# Patient Record
Sex: FEMALE | Race: BLACK OR AFRICAN AMERICAN | NOT HISPANIC OR LATINO | URBAN - METROPOLITAN AREA
[De-identification: names, ages, dates, MRNs, and addresses within clinical notes are randomized per-mention and may not be internally consistent; named-entity substitution may affect disease eponyms.]

---

## 2017-01-02 ENCOUNTER — EMERGENCY (EMERGENCY)
Facility: HOSPITAL | Age: 42
LOS: 1 days | Discharge: HOME | End: 2017-01-02
Attending: EMERGENCY MEDICINE
Payer: COMMERCIAL

## 2017-01-02 VITALS
OXYGEN SATURATION: 97 % | HEART RATE: 94 BPM | TEMPERATURE: 97 F | RESPIRATION RATE: 18 BRPM | SYSTOLIC BLOOD PRESSURE: 130 MMHG | DIASTOLIC BLOOD PRESSURE: 90 MMHG

## 2017-01-02 VITALS
HEIGHT: 64 IN | SYSTOLIC BLOOD PRESSURE: 122 MMHG | DIASTOLIC BLOOD PRESSURE: 88 MMHG | HEART RATE: 88 BPM | WEIGHT: 225.09 LBS | TEMPERATURE: 98 F | RESPIRATION RATE: 17 BRPM | OXYGEN SATURATION: 98 %

## 2017-01-02 DIAGNOSIS — Z98.89 OTHER SPECIFIED POSTPROCEDURAL STATES: Chronic | ICD-10-CM

## 2017-01-02 DIAGNOSIS — N83.201 UNSPECIFIED OVARIAN CYST, RIGHT SIDE: ICD-10-CM

## 2017-01-02 DIAGNOSIS — N83.202 UNSPECIFIED OVARIAN CYST, LEFT SIDE: ICD-10-CM

## 2017-01-02 DIAGNOSIS — N60.91 UNSPECIFIED BENIGN MAMMARY DYSPLASIA OF RIGHT BREAST: Chronic | ICD-10-CM

## 2017-01-02 LAB
ALBUMIN SERPL ELPH-MCNC: 3.8 G/DL — SIGNIFICANT CHANGE UP (ref 3.5–5)
ALP SERPL-CCNC: 60 U/L — SIGNIFICANT CHANGE UP (ref 40–120)
ALT FLD-CCNC: 22 U/L DA — SIGNIFICANT CHANGE UP (ref 10–60)
ANION GAP SERPL CALC-SCNC: 6 MMOL/L — SIGNIFICANT CHANGE UP (ref 5–17)
APPEARANCE UR: CLEAR — SIGNIFICANT CHANGE UP
AST SERPL-CCNC: 21 U/L — SIGNIFICANT CHANGE UP (ref 10–40)
BASOPHILS # BLD AUTO: 0.2 K/UL — SIGNIFICANT CHANGE UP (ref 0–0.2)
BASOPHILS NFR BLD AUTO: 1.6 % — SIGNIFICANT CHANGE UP (ref 0–2)
BILIRUB SERPL-MCNC: 0.3 MG/DL — SIGNIFICANT CHANGE UP (ref 0.2–1.2)
BILIRUB UR-MCNC: NEGATIVE — SIGNIFICANT CHANGE UP
BUN SERPL-MCNC: 8 MG/DL — SIGNIFICANT CHANGE UP (ref 7–18)
CALCIUM SERPL-MCNC: 9.5 MG/DL — SIGNIFICANT CHANGE UP (ref 8.4–10.5)
CHLORIDE SERPL-SCNC: 103 MMOL/L — SIGNIFICANT CHANGE UP (ref 96–108)
CO2 SERPL-SCNC: 28 MMOL/L — SIGNIFICANT CHANGE UP (ref 22–31)
COLOR SPEC: YELLOW — SIGNIFICANT CHANGE UP
CREAT SERPL-MCNC: 0.76 MG/DL — SIGNIFICANT CHANGE UP (ref 0.5–1.3)
DIFF PNL FLD: NEGATIVE — SIGNIFICANT CHANGE UP
EOSINOPHIL # BLD AUTO: 0 K/UL — SIGNIFICANT CHANGE UP (ref 0–0.5)
EOSINOPHIL NFR BLD AUTO: 0.4 % — SIGNIFICANT CHANGE UP (ref 0–6)
GLUCOSE SERPL-MCNC: 82 MG/DL — SIGNIFICANT CHANGE UP (ref 70–99)
GLUCOSE UR QL: NEGATIVE — SIGNIFICANT CHANGE UP
HCG UR QL: NEGATIVE — SIGNIFICANT CHANGE UP
HCT VFR BLD CALC: 44.5 % — SIGNIFICANT CHANGE UP (ref 34.5–45)
HGB BLD-MCNC: 14.3 G/DL — SIGNIFICANT CHANGE UP (ref 11.5–15.5)
KETONES UR-MCNC: NEGATIVE — SIGNIFICANT CHANGE UP
LEUKOCYTE ESTERASE UR-ACNC: NEGATIVE — SIGNIFICANT CHANGE UP
LYMPHOCYTES # BLD AUTO: 37.6 % — SIGNIFICANT CHANGE UP (ref 13–44)
LYMPHOCYTES # BLD AUTO: 4.3 K/UL — HIGH (ref 1–3.3)
MAGNESIUM SERPL-MCNC: 1.8 MG/DL — SIGNIFICANT CHANGE UP (ref 1.8–2.4)
MCHC RBC-ENTMCNC: 29 PG — SIGNIFICANT CHANGE UP (ref 27–34)
MCHC RBC-ENTMCNC: 32.2 GM/DL — SIGNIFICANT CHANGE UP (ref 32–36)
MCV RBC AUTO: 90 FL — SIGNIFICANT CHANGE UP (ref 80–100)
MONOCYTES # BLD AUTO: 0.9 K/UL — SIGNIFICANT CHANGE UP (ref 0–0.9)
MONOCYTES NFR BLD AUTO: 7.8 % — SIGNIFICANT CHANGE UP (ref 2–14)
NEUTROPHILS # BLD AUTO: 6 K/UL — SIGNIFICANT CHANGE UP (ref 1.8–7.4)
NEUTROPHILS NFR BLD AUTO: 52.6 % — SIGNIFICANT CHANGE UP (ref 43–77)
NITRITE UR-MCNC: NEGATIVE — SIGNIFICANT CHANGE UP
PH UR: 5 — SIGNIFICANT CHANGE UP (ref 4.8–8)
PLATELET # BLD AUTO: 399 K/UL — SIGNIFICANT CHANGE UP (ref 150–400)
POTASSIUM SERPL-MCNC: 5 MMOL/L — SIGNIFICANT CHANGE UP (ref 3.5–5.3)
POTASSIUM SERPL-SCNC: 5 MMOL/L — SIGNIFICANT CHANGE UP (ref 3.5–5.3)
PROT SERPL-MCNC: 8.2 G/DL — SIGNIFICANT CHANGE UP (ref 6–8.3)
PROT UR-MCNC: NEGATIVE — SIGNIFICANT CHANGE UP
RBC # BLD: 4.94 M/UL — SIGNIFICANT CHANGE UP (ref 3.8–5.2)
RBC # FLD: 12.2 % — SIGNIFICANT CHANGE UP (ref 10.3–14.5)
SODIUM SERPL-SCNC: 137 MMOL/L — SIGNIFICANT CHANGE UP (ref 135–145)
SP GR SPEC: 1.02 — SIGNIFICANT CHANGE UP (ref 1.01–1.02)
UROBILINOGEN FLD QL: NEGATIVE — SIGNIFICANT CHANGE UP
WBC # BLD: 11.4 K/UL — HIGH (ref 3.8–10.5)
WBC # FLD AUTO: 11.4 K/UL — HIGH (ref 3.8–10.5)

## 2017-01-02 PROCEDURE — 99284 EMERGENCY DEPT VISIT MOD MDM: CPT | Mod: 25

## 2017-01-02 PROCEDURE — 85027 COMPLETE CBC AUTOMATED: CPT

## 2017-01-02 PROCEDURE — 81003 URINALYSIS AUTO W/O SCOPE: CPT

## 2017-01-02 PROCEDURE — 99285 EMERGENCY DEPT VISIT HI MDM: CPT

## 2017-01-02 PROCEDURE — 81025 URINE PREGNANCY TEST: CPT

## 2017-01-02 PROCEDURE — 83735 ASSAY OF MAGNESIUM: CPT

## 2017-01-02 PROCEDURE — 76856 US EXAM PELVIC COMPLETE: CPT

## 2017-01-02 PROCEDURE — 76830 TRANSVAGINAL US NON-OB: CPT

## 2017-01-02 PROCEDURE — 80053 COMPREHEN METABOLIC PANEL: CPT

## 2017-01-02 PROCEDURE — 76830 TRANSVAGINAL US NON-OB: CPT | Mod: 26

## 2017-01-02 PROCEDURE — 96374 THER/PROPH/DIAG INJ IV PUSH: CPT

## 2017-01-02 PROCEDURE — 76856 US EXAM PELVIC COMPLETE: CPT | Mod: 26

## 2017-01-02 RX ORDER — KETOROLAC TROMETHAMINE 30 MG/ML
30 SYRINGE (ML) INJECTION ONCE
Qty: 0 | Refills: 0 | Status: DISCONTINUED | OUTPATIENT
Start: 2017-01-02 | End: 2017-01-02

## 2017-01-02 RX ORDER — IBUPROFEN 200 MG
1 TABLET ORAL
Qty: 20 | Refills: 0 | OUTPATIENT
Start: 2017-01-02 | End: 2017-01-07

## 2017-01-02 RX ADMIN — Medication 30 MILLIGRAM(S): at 16:00

## 2017-01-02 RX ADMIN — Medication 30 MILLIGRAM(S): at 12:24

## 2017-01-02 NOTE — ED PROVIDER NOTE - CONDUCTED A DETAILED DISCUSSION WITH PATIENT AND/OR GUARDIAN REGARDING, MDM
return to ED if symptoms worsen, persist or questions arise/need for outpatient follow-up return to ED if symptoms worsen, persist or questions arise/need for outpatient follow-up/radiology results/lab results

## 2017-01-02 NOTE — ED ADULT NURSE NOTE - OBJECTIVE STATEMENT
AOX3 +ambulatory patient reports lower abdominal pain radiating to the leg. Patient denies any recent travels, no fevers

## 2017-01-02 NOTE — ED PROVIDER NOTE - OBJECTIVE STATEMENT
42 y/o female with no PMHx presents to the ED c/o abd pain onset today. Pt reports she has an "e-sure" that prevents pregnancy, today, pt notes cramping to lower abd and lower back pain with numbness in leg. Pt denies fever, dysuria, vaginal bleeding, discharge, vomiting, or any other complaints. LMP: 11/24/16, denies pregnancy.

## 2017-02-28 ENCOUNTER — RESULT REVIEW (OUTPATIENT)
Age: 42
End: 2017-02-28

## 2017-03-20 ENCOUNTER — EMERGENCY (EMERGENCY)
Facility: HOSPITAL | Age: 42
LOS: 1 days | Discharge: HOME | End: 2017-03-20
Attending: EMERGENCY MEDICINE
Payer: COMMERCIAL

## 2017-03-20 VITALS
RESPIRATION RATE: 18 BRPM | HEIGHT: 64 IN | OXYGEN SATURATION: 100 % | HEART RATE: 90 BPM | TEMPERATURE: 98 F | WEIGHT: 218.04 LBS | DIASTOLIC BLOOD PRESSURE: 87 MMHG | SYSTOLIC BLOOD PRESSURE: 110 MMHG

## 2017-03-20 DIAGNOSIS — S80.01XA CONTUSION OF RIGHT KNEE, INITIAL ENCOUNTER: ICD-10-CM

## 2017-03-20 DIAGNOSIS — Z98.89 OTHER SPECIFIED POSTPROCEDURAL STATES: Chronic | ICD-10-CM

## 2017-03-20 DIAGNOSIS — M54.5 LOW BACK PAIN: ICD-10-CM

## 2017-03-20 DIAGNOSIS — M25.562 PAIN IN LEFT KNEE: ICD-10-CM
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
## 2017-03-20 DIAGNOSIS — N60.91 UNSPECIFIED BENIGN MAMMARY DYSPLASIA OF RIGHT BREAST: Chronic | ICD-10-CM

## 2017-03-20 PROCEDURE — 99283 EMERGENCY DEPT VISIT LOW MDM: CPT

## 2017-03-20 PROCEDURE — 73562 X-RAY EXAM OF KNEE 3: CPT | Mod: 26,RT

## 2017-03-20 PROCEDURE — 73562 X-RAY EXAM OF KNEE 3: CPT

## 2017-03-20 RX ORDER — METHOCARBAMOL 500 MG/1
750 TABLET, FILM COATED ORAL ONCE
Qty: 0 | Refills: 0 | Status: COMPLETED | OUTPATIENT
Start: 2017-03-20 | End: 2017-03-20

## 2017-03-20 RX ORDER — IBUPROFEN 200 MG
1 TABLET ORAL
Qty: 20 | Refills: 0 | OUTPATIENT
Start: 2017-03-20 | End: 2017-03-25

## 2017-03-20 RX ORDER — METHOCARBAMOL 500 MG/1
1 TABLET, FILM COATED ORAL
Qty: 12 | Refills: 0 | OUTPATIENT
Start: 2017-03-20 | End: 2017-03-24

## 2017-03-20 RX ORDER — IBUPROFEN 200 MG
600 TABLET ORAL ONCE
Qty: 0 | Refills: 0 | Status: COMPLETED | OUTPATIENT
Start: 2017-03-20 | End: 2017-03-20

## 2017-03-20 RX ADMIN — Medication 600 MILLIGRAM(S): at 20:01

## 2017-03-20 RX ADMIN — Medication 600 MILLIGRAM(S): at 20:20
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
## 2017-03-20 NOTE — ED PROVIDER NOTE - PHYSICAL EXAMINATION
Right knee: mild anterior swelling with patellar bony TTP. full range of motion. DP/PT pulses intact 2+.  Left knee: full range of motion, no swelling, no TTP. Ambulatory.
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
## 2017-03-20 NOTE — ED PROVIDER NOTE - PROGRESS NOTE DETAILS
Xray negative for fracture/dislocation. Will discharge with IBU and Robaxin Rx and follow up with PMD. Pt is well appearing walking with steady gait, stable for discharge and follow up without fail with medical doctor. I had a detailed discussion with the patient and/or guardian regarding the historical points, exam findings, and any diagnostic results supporting the discharge diagnosis. Pt educated on care and need for follow up. Strict return instructions and red flag signs and symptoms discussed with patient. Questions answered. Pt shows understanding of discharge information and agrees to follow.

## 2017-03-20 NOTE — ED PROVIDER NOTE - MUSCULOSKELETAL MINIMAL EXAM
normal range of motion/No spinal/paraspinal TTP. Mild localized right lower back point TTP and muscle tenseness

## 2017-03-20 NOTE — ED PROVIDER NOTE - CONDUCTED A DETAILED DISCUSSION WITH PATIENT AND/OR GUARDIAN REGARDING, MDM
need for outpatient follow-up/return to ED if symptoms worsen, persist or questions arise return to ED if symptoms worsen, persist or questions arise/radiology results/need for outpatient follow-up

## 2017-11-16 NOTE — ED ADULT NURSE NOTE - PATIENT DISCHARGE SIGNATURE
Called to discuss results and recommendations with patient per Mahendra Noonan MD, a message was left for the patient to call back.     20-Mar-2017

## 2018-06-12 ENCOUNTER — APPOINTMENT (OUTPATIENT)
Dept: SURGERY | Facility: CLINIC | Age: 43
End: 2018-06-12
Payer: COMMERCIAL

## 2018-06-12 VITALS
TEMPERATURE: 98.5 F | DIASTOLIC BLOOD PRESSURE: 82 MMHG | BODY MASS INDEX: 39.44 KG/M2 | HEART RATE: 83 BPM | OXYGEN SATURATION: 99 % | HEIGHT: 64 IN | SYSTOLIC BLOOD PRESSURE: 121 MMHG | WEIGHT: 231 LBS

## 2018-06-12 DIAGNOSIS — K21.9 GASTRO-ESOPHAGEAL REFLUX DISEASE W/OUT ESOPHAGITIS: ICD-10-CM

## 2018-06-12 PROCEDURE — 99213 OFFICE O/P EST LOW 20 MIN: CPT

## 2018-06-15 ENCOUNTER — EMERGENCY (EMERGENCY)
Facility: HOSPITAL | Age: 43
LOS: 1 days | Discharge: HOME | End: 2018-06-15
Payer: COMMERCIAL

## 2018-06-15 VITALS
WEIGHT: 225.09 LBS | RESPIRATION RATE: 18 BRPM | OXYGEN SATURATION: 100 % | HEART RATE: 83 BPM | HEIGHT: 64 IN | SYSTOLIC BLOOD PRESSURE: 116 MMHG | DIASTOLIC BLOOD PRESSURE: 82 MMHG | TEMPERATURE: 98 F

## 2018-06-15 DIAGNOSIS — N60.91 UNSPECIFIED BENIGN MAMMARY DYSPLASIA OF RIGHT BREAST: Chronic | ICD-10-CM

## 2018-06-15 DIAGNOSIS — Z98.89 OTHER SPECIFIED POSTPROCEDURAL STATES: Chronic | ICD-10-CM

## 2018-06-15 PROCEDURE — 99284 EMERGENCY DEPT VISIT MOD MDM: CPT

## 2018-06-15 PROCEDURE — 73502 X-RAY EXAM HIP UNI 2-3 VIEWS: CPT

## 2018-06-15 PROCEDURE — 73502 X-RAY EXAM HIP UNI 2-3 VIEWS: CPT | Mod: 26,LT

## 2018-06-15 PROCEDURE — 99283 EMERGENCY DEPT VISIT LOW MDM: CPT | Mod: 25

## 2018-06-15 RX ORDER — IBUPROFEN 200 MG
600 TABLET ORAL ONCE
Qty: 0 | Refills: 0 | Status: COMPLETED | OUTPATIENT
Start: 2018-06-15 | End: 2018-06-15

## 2018-06-15 RX ORDER — METHOCARBAMOL 500 MG/1
2 TABLET, FILM COATED ORAL
Qty: 24 | Refills: 0 | OUTPATIENT
Start: 2018-06-15 | End: 2018-06-18

## 2018-06-15 RX ORDER — METHOCARBAMOL 500 MG/1
1500 TABLET, FILM COATED ORAL ONCE
Qty: 0 | Refills: 0 | Status: COMPLETED | OUTPATIENT
Start: 2018-06-15 | End: 2018-06-15

## 2018-06-15 RX ORDER — IBUPROFEN 200 MG
1 TABLET ORAL
Qty: 20 | Refills: 0 | OUTPATIENT
Start: 2018-06-15 | End: 2018-06-19

## 2018-06-15 RX ADMIN — METHOCARBAMOL 1500 MILLIGRAM(S): 500 TABLET, FILM COATED ORAL at 17:26

## 2018-06-15 RX ADMIN — Medication 600 MILLIGRAM(S): at 17:27

## 2018-06-15 RX ADMIN — Medication 600 MILLIGRAM(S): at 18:33

## 2018-06-15 NOTE — ED PROVIDER NOTE - MEDICAL DECISION MAKING DETAILS
43 y/o F pt presents with L hip pain s/p mechanical fall. Likely contusion. Will X-Ray to r/o fracture of the L hip, and will reassess.

## 2018-06-15 NOTE — ED PROVIDER NOTE - PMH
GERD (gastroesophageal reflux disease)    Herniation of intervertebral disc of lumbar region    Ovarian cyst

## 2018-06-15 NOTE — ED PROVIDER NOTE - CONDUCTED A DETAILED DISCUSSION WITH PATIENT AND/OR GUARDIAN REGARDING, MDM
need for outpatient follow-up/radiology results return to ED if symptoms worsen, persist or questions arise/need for outpatient follow-up/radiology results

## 2018-06-20 ENCOUNTER — OUTPATIENT (OUTPATIENT)
Dept: OUTPATIENT SERVICES | Facility: HOSPITAL | Age: 43
LOS: 1 days | End: 2018-06-20
Payer: COMMERCIAL

## 2018-06-20 ENCOUNTER — APPOINTMENT (OUTPATIENT)
Dept: RADIOLOGY | Facility: HOSPITAL | Age: 43
End: 2018-06-20

## 2018-06-20 DIAGNOSIS — N60.91 UNSPECIFIED BENIGN MAMMARY DYSPLASIA OF RIGHT BREAST: Chronic | ICD-10-CM

## 2018-06-20 DIAGNOSIS — Z98.89 OTHER SPECIFIED POSTPROCEDURAL STATES: Chronic | ICD-10-CM

## 2018-06-20 DIAGNOSIS — Z98.84 BARIATRIC SURGERY STATUS: ICD-10-CM

## 2018-06-20 DIAGNOSIS — Z46.51 ENCOUNTER FOR FITTING AND ADJUSTMENT OF GASTRIC LAP BAND: ICD-10-CM

## 2018-06-20 PROCEDURE — 74241: CPT

## 2018-06-20 PROCEDURE — 74241: CPT | Mod: 26

## 2018-06-26 ENCOUNTER — APPOINTMENT (OUTPATIENT)
Dept: SURGERY | Facility: CLINIC | Age: 43
End: 2018-06-26
Payer: COMMERCIAL

## 2018-06-26 VITALS
OXYGEN SATURATION: 99 % | DIASTOLIC BLOOD PRESSURE: 87 MMHG | HEIGHT: 64 IN | TEMPERATURE: 98.6 F | WEIGHT: 231 LBS | BODY MASS INDEX: 39.44 KG/M2 | HEART RATE: 86 BPM | SYSTOLIC BLOOD PRESSURE: 130 MMHG

## 2018-06-26 PROCEDURE — 99213 OFFICE O/P EST LOW 20 MIN: CPT

## 2018-07-20 PROBLEM — N83.209 UNSPECIFIED OVARIAN CYST, UNSPECIFIED SIDE: Chronic | Status: ACTIVE | Noted: 2017-03-20

## 2018-07-20 PROBLEM — M51.26 OTHER INTERVERTEBRAL DISC DISPLACEMENT, LUMBAR REGION: Chronic | Status: ACTIVE | Noted: 2018-06-16

## 2018-07-24 ENCOUNTER — OUTPATIENT (OUTPATIENT)
Dept: OUTPATIENT SERVICES | Facility: HOSPITAL | Age: 43
LOS: 1 days | End: 2018-07-24
Payer: COMMERCIAL

## 2018-07-24 VITALS
DIASTOLIC BLOOD PRESSURE: 88 MMHG | TEMPERATURE: 98 F | HEIGHT: 64 IN | SYSTOLIC BLOOD PRESSURE: 124 MMHG | WEIGHT: 225.09 LBS | HEART RATE: 76 BPM | RESPIRATION RATE: 18 BRPM

## 2018-07-24 DIAGNOSIS — K95.09 OTHER COMPLICATIONS OF GASTRIC BAND PROCEDURE: ICD-10-CM

## 2018-07-24 DIAGNOSIS — Z98.890 OTHER SPECIFIED POSTPROCEDURAL STATES: Chronic | ICD-10-CM

## 2018-07-24 DIAGNOSIS — Z98.89 OTHER SPECIFIED POSTPROCEDURAL STATES: Chronic | ICD-10-CM

## 2018-07-24 DIAGNOSIS — K44.9 DIAPHRAGMATIC HERNIA WITHOUT OBSTRUCTION OR GANGRENE: ICD-10-CM

## 2018-07-24 DIAGNOSIS — Z01.818 ENCOUNTER FOR OTHER PREPROCEDURAL EXAMINATION: ICD-10-CM

## 2018-07-24 DIAGNOSIS — Z98.84 BARIATRIC SURGERY STATUS: Chronic | ICD-10-CM

## 2018-07-24 DIAGNOSIS — N60.91 UNSPECIFIED BENIGN MAMMARY DYSPLASIA OF RIGHT BREAST: Chronic | ICD-10-CM

## 2018-07-24 LAB — HCG UR QL: NEGATIVE — SIGNIFICANT CHANGE UP

## 2018-07-24 PROCEDURE — 81025 URINE PREGNANCY TEST: CPT

## 2018-07-24 PROCEDURE — G0463: CPT

## 2018-07-24 NOTE — H&P PST ADULT - FAMILY HISTORY
Father  Still living? Yes, Estimated age: 61-70  Family history of stroke, Age at diagnosis: Age Unknown     Mother  Still living? Yes, Estimated age: 61-70  Family history of congestive heart failure, Age at diagnosis: Age Unknown

## 2018-07-24 NOTE — H&P PST ADULT - PMH
GERD (gastroesophageal reflux disease)    Herniation of intervertebral disc of lumbar region    Hiatal hernia    Obstructive sleep apnea on CPAP    Ovarian cyst GERD (gastroesophageal reflux disease)    Herniation of intervertebral disc of lumbar region    Hiatal hernia    Obstructive sleep apnea on CPAP    Osteoarthritis    Ovarian cyst    Tendonitis of left hip

## 2018-07-24 NOTE — H&P PST ADULT - PSH
Benign cyst of right breast    History of adjustable gastric banding    History of arthroscopy of both knees    History of     S/P D&C (status post dilation and curettage)

## 2018-07-24 NOTE — H&P PST ADULT - ASSESSMENT
42-year-old female with history of arthritis, left hip tendonitis comes to PST with reports of frequent heartburn and  reflux.

## 2018-07-24 NOTE — H&P PST ADULT - HISTORY OF PRESENT ILLNESS
Patient reports an increase of heartburn and  reflux over the past two years.  Patient has a hiatal hernia  and states food does not always go down.

## 2018-07-25 ENCOUNTER — TRANSCRIPTION ENCOUNTER (OUTPATIENT)
Age: 43
End: 2018-07-25

## 2018-07-26 ENCOUNTER — INPATIENT (INPATIENT)
Facility: HOSPITAL | Age: 43
LOS: 0 days | Discharge: HOME | DRG: 328 | End: 2018-07-27
Attending: SPECIALIST | Admitting: SPECIALIST
Payer: COMMERCIAL

## 2018-07-26 ENCOUNTER — RESULT REVIEW (OUTPATIENT)
Age: 43
End: 2018-07-26

## 2018-07-26 VITALS
OXYGEN SATURATION: 100 % | DIASTOLIC BLOOD PRESSURE: 83 MMHG | HEART RATE: 80 BPM | SYSTOLIC BLOOD PRESSURE: 114 MMHG | HEIGHT: 64 IN | TEMPERATURE: 99 F | RESPIRATION RATE: 18 BRPM | WEIGHT: 225.09 LBS

## 2018-07-26 DIAGNOSIS — Z98.89 OTHER SPECIFIED POSTPROCEDURAL STATES: Chronic | ICD-10-CM

## 2018-07-26 DIAGNOSIS — K95.09 OTHER COMPLICATIONS OF GASTRIC BAND PROCEDURE: ICD-10-CM

## 2018-07-26 DIAGNOSIS — N60.91 UNSPECIFIED BENIGN MAMMARY DYSPLASIA OF RIGHT BREAST: Chronic | ICD-10-CM

## 2018-07-26 DIAGNOSIS — Z98.84 BARIATRIC SURGERY STATUS: Chronic | ICD-10-CM

## 2018-07-26 DIAGNOSIS — Z98.890 OTHER SPECIFIED POSTPROCEDURAL STATES: Chronic | ICD-10-CM

## 2018-07-26 LAB — BLD GP AB SCN SERPL QL: SIGNIFICANT CHANGE UP

## 2018-07-26 PROCEDURE — 88300 SURGICAL PATH GROSS: CPT | Mod: 26

## 2018-07-26 PROCEDURE — 43774 LAP RMVL GASTR ADJ ALL PARTS: CPT | Mod: AS

## 2018-07-26 RX ORDER — ONDANSETRON 8 MG/1
4 TABLET, FILM COATED ORAL ONCE
Qty: 0 | Refills: 0 | Status: DISCONTINUED | OUTPATIENT
Start: 2018-07-26 | End: 2018-07-26

## 2018-07-26 RX ORDER — PANTOPRAZOLE SODIUM 20 MG/1
40 TABLET, DELAYED RELEASE ORAL DAILY
Qty: 0 | Refills: 0 | Status: DISCONTINUED | OUTPATIENT
Start: 2018-07-26 | End: 2018-07-27

## 2018-07-26 RX ORDER — ACETAMINOPHEN 500 MG
1000 TABLET ORAL ONCE
Qty: 0 | Refills: 0 | Status: COMPLETED | OUTPATIENT
Start: 2018-07-26 | End: 2018-07-26

## 2018-07-26 RX ORDER — ACETAMINOPHEN 500 MG
1000 TABLET ORAL ONCE
Qty: 0 | Refills: 0 | Status: DISCONTINUED | OUTPATIENT
Start: 2018-07-26 | End: 2018-07-27

## 2018-07-26 RX ORDER — KETOROLAC TROMETHAMINE 30 MG/ML
30 SYRINGE (ML) INJECTION EVERY 6 HOURS
Qty: 0 | Refills: 0 | Status: DISCONTINUED | OUTPATIENT
Start: 2018-07-26 | End: 2018-07-27

## 2018-07-26 RX ORDER — HEPARIN SODIUM 5000 [USP'U]/ML
5000 INJECTION INTRAVENOUS; SUBCUTANEOUS EVERY 8 HOURS
Qty: 0 | Refills: 0 | Status: DISCONTINUED | OUTPATIENT
Start: 2018-07-26 | End: 2018-07-27

## 2018-07-26 RX ORDER — SODIUM CHLORIDE 9 MG/ML
1000 INJECTION, SOLUTION INTRAVENOUS
Qty: 0 | Refills: 0 | Status: DISCONTINUED | OUTPATIENT
Start: 2018-07-26 | End: 2018-07-27

## 2018-07-26 RX ORDER — MORPHINE SULFATE 50 MG/1
4 CAPSULE, EXTENDED RELEASE ORAL EVERY 6 HOURS
Qty: 0 | Refills: 0 | Status: DISCONTINUED | OUTPATIENT
Start: 2018-07-26 | End: 2018-07-27

## 2018-07-26 RX ORDER — HYDROMORPHONE HYDROCHLORIDE 2 MG/ML
0.5 INJECTION INTRAMUSCULAR; INTRAVENOUS; SUBCUTANEOUS
Qty: 0 | Refills: 0 | Status: DISCONTINUED | OUTPATIENT
Start: 2018-07-26 | End: 2018-07-26

## 2018-07-26 RX ORDER — SODIUM CHLORIDE 9 MG/ML
3 INJECTION INTRAMUSCULAR; INTRAVENOUS; SUBCUTANEOUS EVERY 8 HOURS
Qty: 0 | Refills: 0 | Status: DISCONTINUED | OUTPATIENT
Start: 2018-07-26 | End: 2018-07-26

## 2018-07-26 RX ORDER — ONDANSETRON 8 MG/1
4 TABLET, FILM COATED ORAL EVERY 6 HOURS
Qty: 0 | Refills: 0 | Status: DISCONTINUED | OUTPATIENT
Start: 2018-07-26 | End: 2018-07-27

## 2018-07-26 RX ADMIN — HEPARIN SODIUM 5000 UNIT(S): 5000 INJECTION INTRAVENOUS; SUBCUTANEOUS at 21:24

## 2018-07-26 RX ADMIN — Medication 1000 MILLIGRAM(S): at 17:44

## 2018-07-26 RX ADMIN — HYDROMORPHONE HYDROCHLORIDE 0.5 MILLIGRAM(S): 2 INJECTION INTRAMUSCULAR; INTRAVENOUS; SUBCUTANEOUS at 17:44

## 2018-07-26 RX ADMIN — Medication 400 MILLIGRAM(S): at 15:45

## 2018-07-26 NOTE — ASU PATIENT PROFILE, ADULT - PMH
GERD (gastroesophageal reflux disease)    Herniation of intervertebral disc of lumbar region    Hiatal hernia    Obstructive sleep apnea on CPAP    Osteoarthritis    Ovarian cyst    Tendonitis of left hip

## 2018-07-26 NOTE — CHART NOTE - NSCHARTNOTEFT_GEN_A_CORE
Pt POD 0 s/p laparoscopic DEWAYNE, removal gastric band/port  comfortable  no n/v  voided    Vital Signs Last 24 Hrs  T(C): 36.8 (26 Jul 2018 21:49), Max: 37.1 (26 Jul 2018 18:15)  T(F): 98.2 (26 Jul 2018 21:49), Max: 98.7 (26 Jul 2018 18:15)  HR: 64 (26 Jul 2018 21:49) (64 - 89)  BP: 120/74 (26 Jul 2018 21:49) (108/63 - 128/85)  BP(mean): --  RR: 16 (26 Jul 2018 21:49) (13 - 18)  SpO2: 100% (26 Jul 2018 21:49) (98% - 100%)    abd soft, inc sites CDI    stable post-op

## 2018-07-26 NOTE — BRIEF OPERATIVE NOTE - PROCEDURE
<<-----Click on this checkbox to enter Procedure Serosal tear repair  07/26/2018  Gastric  Active  OVIDIO  Laparoscopic lysis of adhesions  07/26/2018    Active  OVIDIO  Laparoscopic removal of gastric band and port  07/26/2018    Active  MOZGA

## 2018-07-27 ENCOUNTER — TRANSCRIPTION ENCOUNTER (OUTPATIENT)
Age: 43
End: 2018-07-27

## 2018-07-27 VITALS
TEMPERATURE: 98 F | DIASTOLIC BLOOD PRESSURE: 80 MMHG | OXYGEN SATURATION: 100 % | HEART RATE: 71 BPM | RESPIRATION RATE: 18 BRPM | SYSTOLIC BLOOD PRESSURE: 124 MMHG

## 2018-07-27 LAB
ANION GAP SERPL CALC-SCNC: 6 MMOL/L — SIGNIFICANT CHANGE UP (ref 5–17)
BASOPHILS # BLD AUTO: 0.1 K/UL — SIGNIFICANT CHANGE UP (ref 0–0.2)
BASOPHILS NFR BLD AUTO: 0.8 % — SIGNIFICANT CHANGE UP (ref 0–2)
BUN SERPL-MCNC: 10 MG/DL — SIGNIFICANT CHANGE UP (ref 7–18)
CALCIUM SERPL-MCNC: 8.9 MG/DL — SIGNIFICANT CHANGE UP (ref 8.4–10.5)
CHLORIDE SERPL-SCNC: 104 MMOL/L — SIGNIFICANT CHANGE UP (ref 96–108)
CO2 SERPL-SCNC: 29 MMOL/L — SIGNIFICANT CHANGE UP (ref 22–31)
CREAT SERPL-MCNC: 0.78 MG/DL — SIGNIFICANT CHANGE UP (ref 0.5–1.3)
EOSINOPHIL # BLD AUTO: 0 K/UL — SIGNIFICANT CHANGE UP (ref 0–0.5)
EOSINOPHIL NFR BLD AUTO: 0.2 % — SIGNIFICANT CHANGE UP (ref 0–6)
GLUCOSE SERPL-MCNC: 80 MG/DL — SIGNIFICANT CHANGE UP (ref 70–99)
HCT VFR BLD CALC: 40 % — SIGNIFICANT CHANGE UP (ref 34.5–45)
HGB BLD-MCNC: 12.7 G/DL — SIGNIFICANT CHANGE UP (ref 11.5–15.5)
LYMPHOCYTES # BLD AUTO: 2.3 K/UL — SIGNIFICANT CHANGE UP (ref 1–3.3)
LYMPHOCYTES # BLD AUTO: 20.8 % — SIGNIFICANT CHANGE UP (ref 13–44)
MCHC RBC-ENTMCNC: 29.3 PG — SIGNIFICANT CHANGE UP (ref 27–34)
MCHC RBC-ENTMCNC: 31.8 GM/DL — LOW (ref 32–36)
MCV RBC AUTO: 92.2 FL — SIGNIFICANT CHANGE UP (ref 80–100)
MONOCYTES # BLD AUTO: 0.8 K/UL — SIGNIFICANT CHANGE UP (ref 0–0.9)
MONOCYTES NFR BLD AUTO: 7.3 % — SIGNIFICANT CHANGE UP (ref 2–14)
NEUTROPHILS # BLD AUTO: 7.9 K/UL — HIGH (ref 1.8–7.4)
NEUTROPHILS NFR BLD AUTO: 70.9 % — SIGNIFICANT CHANGE UP (ref 43–77)
PLATELET # BLD AUTO: 366 K/UL — SIGNIFICANT CHANGE UP (ref 150–400)
POTASSIUM SERPL-MCNC: 4 MMOL/L — SIGNIFICANT CHANGE UP (ref 3.5–5.3)
POTASSIUM SERPL-SCNC: 4 MMOL/L — SIGNIFICANT CHANGE UP (ref 3.5–5.3)
RBC # BLD: 4.34 M/UL — SIGNIFICANT CHANGE UP (ref 3.8–5.2)
RBC # FLD: 12.4 % — SIGNIFICANT CHANGE UP (ref 10.3–14.5)
SODIUM SERPL-SCNC: 139 MMOL/L — SIGNIFICANT CHANGE UP (ref 135–145)
WBC # BLD: 11.2 K/UL — HIGH (ref 3.8–10.5)
WBC # FLD AUTO: 11.2 K/UL — HIGH (ref 3.8–10.5)

## 2018-07-27 PROCEDURE — 86901 BLOOD TYPING SEROLOGIC RH(D): CPT

## 2018-07-27 PROCEDURE — 80048 BASIC METABOLIC PNL TOTAL CA: CPT

## 2018-07-27 PROCEDURE — 86900 BLOOD TYPING SEROLOGIC ABO: CPT

## 2018-07-27 PROCEDURE — 85027 COMPLETE CBC AUTOMATED: CPT

## 2018-07-27 PROCEDURE — 88300 SURGICAL PATH GROSS: CPT

## 2018-07-27 PROCEDURE — 86850 RBC ANTIBODY SCREEN: CPT

## 2018-07-27 RX ORDER — PANTOPRAZOLE SODIUM 20 MG/1
40 TABLET, DELAYED RELEASE ORAL DAILY
Qty: 0 | Refills: 0 | Status: DISCONTINUED | OUTPATIENT
Start: 2018-07-27 | End: 2018-07-27

## 2018-07-27 RX ORDER — OXYCODONE AND ACETAMINOPHEN 5; 325 MG/1; MG/1
1 TABLET ORAL EVERY 6 HOURS
Qty: 0 | Refills: 0 | Status: DISCONTINUED | OUTPATIENT
Start: 2018-07-27 | End: 2018-07-27

## 2018-07-27 RX ORDER — PANTOPRAZOLE SODIUM 20 MG/1
40 TABLET, DELAYED RELEASE ORAL
Qty: 0 | Refills: 0 | Status: DISCONTINUED | OUTPATIENT
Start: 2018-07-27 | End: 2018-07-27

## 2018-07-27 RX ORDER — PANTOPRAZOLE SODIUM 20 MG/1
1 TABLET, DELAYED RELEASE ORAL
Qty: 30 | Refills: 0
Start: 2018-07-27 | End: 2018-08-25

## 2018-07-27 RX ADMIN — Medication 30 MILLIGRAM(S): at 07:00

## 2018-07-27 RX ADMIN — OXYCODONE AND ACETAMINOPHEN 1 TABLET(S): 5; 325 TABLET ORAL at 16:07

## 2018-07-27 RX ADMIN — HEPARIN SODIUM 5000 UNIT(S): 5000 INJECTION INTRAVENOUS; SUBCUTANEOUS at 06:29

## 2018-07-27 RX ADMIN — HEPARIN SODIUM 5000 UNIT(S): 5000 INJECTION INTRAVENOUS; SUBCUTANEOUS at 15:07

## 2018-07-27 RX ADMIN — OXYCODONE AND ACETAMINOPHEN 1 TABLET(S): 5; 325 TABLET ORAL at 15:07

## 2018-07-27 RX ADMIN — Medication 30 MILLIGRAM(S): at 06:29

## 2018-07-27 NOTE — DISCHARGE NOTE ADULT - MEDICATION SUMMARY - MEDICATIONS TO TAKE
I will START or STAY ON the medications listed below when I get home from the hospital:    oxyCODONE-acetaminophen 5 mg-325 mg oral tablet  -- 1 tab(s) by mouth every 6 hours, As needed, Moderate Pain (4 - 6) MDD:4  -- Indication: For moderate pain     Tums  --  by mouth , As Needed  -- Indication: For as directed     Protonix 40 mg oral delayed release tablet  -- 1 tab(s) by mouth once a day   -- It is very important that you take or use this exactly as directed.  Do not skip doses or discontinue unless directed by your doctor.  Obtain medical advice before taking any non-prescription drugs as some may affect the action of this medication.  Swallow whole.  Do not crush.    -- Indication: For GI ppx

## 2018-07-27 NOTE — DISCHARGE NOTE ADULT - PATIENT PORTAL LINK FT
You can access the [facility] Portal, offered by [facility], by registering with the following website: [email or web address]

## 2018-07-27 NOTE — DISCHARGE NOTE ADULT - PLAN OF CARE
wound healing Continue pureed diet for the next 4 days, afterwards may resume regular diet. Follow up with  in 1 week in office continue all home medication, follow up with your PCP

## 2018-07-27 NOTE — DISCHARGE NOTE ADULT - CARE PLAN
Principal Discharge DX:	History of adjustable gastric banding  Goal:	wound healing  Assessment and plan of treatment:	Continue pureed diet for the next 4 days, afterwards may resume regular diet. Follow up with  in 1 week in office  Secondary Diagnosis:	GERD (gastroesophageal reflux disease)  Assessment and plan of treatment:	continue all home medication, follow up with your PCP  Secondary Diagnosis:	Obstructive sleep apnea on CPAP  Assessment and plan of treatment:	continue all home medication, follow up with your PCP  Secondary Diagnosis:	Osteoarthritis  Assessment and plan of treatment:	continue all home medication, follow up with your PCP  Secondary Diagnosis:	Tendonitis of left hip  Secondary Diagnosis:	Hiatal hernia  Secondary Diagnosis:	Benign cyst of right breast

## 2018-07-27 NOTE — DISCHARGE NOTE ADULT - SECONDARY DIAGNOSIS.
GERD (gastroesophageal reflux disease) Obstructive sleep apnea on CPAP Osteoarthritis Tendonitis of left hip Hiatal hernia Benign cyst of right breast

## 2018-07-27 NOTE — PROGRESS NOTE ADULT - SUBJECTIVE AND OBJECTIVE BOX
INTERVAL HPI/OVERNIGHT EVENTS:    Pt s/p gastric band removal, DEWAYNE, gastric serosal tear repair 7/26,  seen and examined at bedside. Admits to incisional pain, denies nausea or vomiting. No fever, chills, SOB or CP. Pt is NPO.     Vital Signs Last 24 Hrs  T(C): 36.5 (27 Jul 2018 06:53), Max: 37.1 (26 Jul 2018 18:15)  T(F): 97.7 (27 Jul 2018 06:53), Max: 98.7 (26 Jul 2018 18:15)  HR: 68 (27 Jul 2018 06:53) (62 - 89)  BP: 126/76 (27 Jul 2018 06:53) (108/63 - 137/73)  BP(mean): --  RR: 18 (27 Jul 2018 06:53) (13 - 18)  SpO2: 100% (27 Jul 2018 06:53) (98% - 100%)  I&O's Detail    26 Jul 2018 07:01  -  27 Jul 2018 07:00  --------------------------------------------------------  IN:    Lactated Ringers IV Bolus: 1300 mL  Total IN: 1300 mL    OUT:  Total OUT: 0 mL    Total NET: 1300 mL        pantoprazole  Injectable 40 milliGRAM(s) IV Push daily      Physical Exam  General: AAOx3, No acute distress  Skin: No jaundice, no icterus  Abdomen: soft, nondistended, incisional TTP, dressing c/d/i, no rebound tenderness, no guarding, no palpable masses  Extremities: non edematous, no calf pain bilaterally

## 2018-07-27 NOTE — PROGRESS NOTE ADULT - ASSESSMENT
43 y/o Female s/p gastric band removal, DEWAYNE, gastric serosal tear repair 7/26    -Advance diet as tolerated   -Pain medication PRN   -GI ppx   -DVT ppx   -OOB/ Ambulate   -DC planning

## 2018-07-31 LAB — SURGICAL PATHOLOGY FINAL REPORT - CH: SIGNIFICANT CHANGE UP

## 2018-09-12 PROBLEM — K44.9 DIAPHRAGMATIC HERNIA WITHOUT OBSTRUCTION OR GANGRENE: Chronic | Status: ACTIVE | Noted: 2018-07-24

## 2018-09-12 PROBLEM — G47.33 OBSTRUCTIVE SLEEP APNEA (ADULT) (PEDIATRIC): Chronic | Status: ACTIVE | Noted: 2018-07-24

## 2018-09-12 PROBLEM — M19.90 UNSPECIFIED OSTEOARTHRITIS, UNSPECIFIED SITE: Chronic | Status: ACTIVE | Noted: 2018-07-24

## 2018-09-12 PROBLEM — M76.892 OTHER SPECIFIED ENTHESOPATHIES OF LEFT LOWER LIMB, EXCLUDING FOOT: Chronic | Status: ACTIVE | Noted: 2018-07-24

## 2018-09-18 ENCOUNTER — APPOINTMENT (OUTPATIENT)
Dept: SURGERY | Facility: CLINIC | Age: 43
End: 2018-09-18

## 2018-12-20 ENCOUNTER — EMERGENCY (EMERGENCY)
Facility: HOSPITAL | Age: 43
LOS: 1 days | Discharge: HOME | End: 2018-12-20
Attending: EMERGENCY MEDICINE
Payer: SELF-PAY

## 2018-12-20 VITALS
RESPIRATION RATE: 20 BRPM | TEMPERATURE: 98 F | HEART RATE: 83 BPM | OXYGEN SATURATION: 98 % | DIASTOLIC BLOOD PRESSURE: 87 MMHG | SYSTOLIC BLOOD PRESSURE: 129 MMHG

## 2018-12-20 VITALS — HEIGHT: 65 IN | WEIGHT: 149.91 LBS

## 2018-12-20 DIAGNOSIS — Z98.89 OTHER SPECIFIED POSTPROCEDURAL STATES: Chronic | ICD-10-CM

## 2018-12-20 DIAGNOSIS — Z98.84 BARIATRIC SURGERY STATUS: Chronic | ICD-10-CM

## 2018-12-20 DIAGNOSIS — Z98.890 OTHER SPECIFIED POSTPROCEDURAL STATES: Chronic | ICD-10-CM

## 2018-12-20 DIAGNOSIS — N60.91 UNSPECIFIED BENIGN MAMMARY DYSPLASIA OF RIGHT BREAST: Chronic | ICD-10-CM

## 2018-12-20 PROCEDURE — 73030 X-RAY EXAM OF SHOULDER: CPT | Mod: 26,RT

## 2018-12-20 PROCEDURE — 99283 EMERGENCY DEPT VISIT LOW MDM: CPT | Mod: 25

## 2018-12-20 PROCEDURE — 99283 EMERGENCY DEPT VISIT LOW MDM: CPT

## 2018-12-20 PROCEDURE — 73030 X-RAY EXAM OF SHOULDER: CPT

## 2018-12-20 RX ORDER — DICLOFENAC SODIUM 30 MG/G
1 GEL TOPICAL
Qty: 60 | Refills: 0
Start: 2018-12-20 | End: 2019-01-18

## 2018-12-20 RX ORDER — ACETAMINOPHEN 500 MG
650 TABLET ORAL ONCE
Qty: 0 | Refills: 0 | Status: COMPLETED | OUTPATIENT
Start: 2018-12-20 | End: 2018-12-20

## 2018-12-20 RX ADMIN — Medication 650 MILLIGRAM(S): at 17:33

## 2018-12-20 NOTE — ED PROVIDER NOTE - MEDICAL DECISION MAKING DETAILS
44 y/o F pt with R shoulder pain and possible axillary nerve strain and muscle spasm. Will check X-ray, give pain control medication and muscle relaxant, and reassess. Pt to follow up with orthopedic and neurology.
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
## 2019-05-24 ENCOUNTER — EMERGENCY (EMERGENCY)
Facility: HOSPITAL | Age: 44
LOS: 1 days | Discharge: HOME | End: 2019-05-24
Attending: EMERGENCY MEDICINE
Payer: COMMERCIAL

## 2019-05-24 VITALS
SYSTOLIC BLOOD PRESSURE: 132 MMHG | WEIGHT: 244.93 LBS | DIASTOLIC BLOOD PRESSURE: 93 MMHG | HEART RATE: 82 BPM | OXYGEN SATURATION: 98 % | RESPIRATION RATE: 16 BRPM | HEIGHT: 64 IN | TEMPERATURE: 97 F

## 2019-05-24 DIAGNOSIS — N60.91 UNSPECIFIED BENIGN MAMMARY DYSPLASIA OF RIGHT BREAST: Chronic | ICD-10-CM

## 2019-05-24 DIAGNOSIS — Z98.89 OTHER SPECIFIED POSTPROCEDURAL STATES: Chronic | ICD-10-CM

## 2019-05-24 DIAGNOSIS — Z98.890 OTHER SPECIFIED POSTPROCEDURAL STATES: Chronic | ICD-10-CM

## 2019-05-24 DIAGNOSIS — Z98.84 BARIATRIC SURGERY STATUS: Chronic | ICD-10-CM

## 2019-05-24 PROCEDURE — 73110 X-RAY EXAM OF WRIST: CPT

## 2019-05-24 PROCEDURE — 73110 X-RAY EXAM OF WRIST: CPT | Mod: 26,50

## 2019-05-24 PROCEDURE — 99283 EMERGENCY DEPT VISIT LOW MDM: CPT

## 2019-05-24 NOTE — ED ADULT TRIAGE NOTE - CHIEF COMPLAINT QUOTE
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
## 2019-05-25 NOTE — ED PROVIDER NOTE - PHYSICAL EXAMINATION
CONSTITUTIONAL: Well appearing, well nourished, awake, alert and in no apparent distress.  HEENT: Head is atraumatic. Eyes clear bilaterally, normal EOMI. Airway patent.  CARDIAC: Normal rate, regular rhythm.  Heart sounds S1, S2.   RESPIRATORY: Breath sounds clear and equal bilaterally. no tachypnea, respiratory distress.   GASTROINTESTINAL: Abdomen soft, non-tender, no guarding, distension.  MUSCULOSKELETAL: Spine appears normal, no midline spinal tenderness, range of motion is not limited, no muscle or joint tenderness.  no focal bony injury to wrist b/l, NVI, no bony tenderness to hand  NEUROLOGICAL: Alert and oriented, no focal deficits, no motor or sensory deficits.  SKIN: Skin normal color for race, warm, dry and intact. No evidence of rash.  PSYCHIATRIC: Alert and oriented to person, place, time/situation. normal mood and affect. no apparent risk to self or others.

## 2019-05-25 NOTE — ED PROVIDER NOTE - OBJECTIVE STATEMENT
Pt previously healthy with complaints of bilateral [facility], no falls, paresthesias, numbness, no other injury, Pt has not tried anything for symptoms, no other aggravating or relieving factors.

## 2019-05-25 NOTE — ED ADULT NURSE NOTE - NSIMPLEMENTINTERV_GEN_ALL_ED
Implemented All Universal Safety Interventions:  [place] to call system. Call bell, personal items and telephone within reach. Instruct patient to call for assistance. Room bathroom lighting operational. Non-slip footwear when patient is off stretcher. Physically safe environment: no spills, clutter or unnecessary equipment. Stretcher in lowest position, wheels locked, appropriate side rails in place.

## 2019-07-20 ENCOUNTER — EMERGENCY (EMERGENCY)
Facility: HOSPITAL | Age: 44
LOS: 1 days | Discharge: HOME | End: 2019-07-20
Attending: STUDENT IN AN ORGANIZED HEALTH CARE EDUCATION/TRAINING PROGRAM
Payer: COMMERCIAL

## 2019-07-20 VITALS
WEIGHT: 244.93 LBS | SYSTOLIC BLOOD PRESSURE: 111 MMHG | HEART RATE: 73 BPM | HEIGHT: 64 IN | OXYGEN SATURATION: 97 % | DIASTOLIC BLOOD PRESSURE: 77 MMHG | TEMPERATURE: 98 F | RESPIRATION RATE: 20 BRPM

## 2019-07-20 DIAGNOSIS — Z98.89 OTHER SPECIFIED POSTPROCEDURAL STATES: Chronic | ICD-10-CM

## 2019-07-20 DIAGNOSIS — Z98.890 OTHER SPECIFIED POSTPROCEDURAL STATES: Chronic | ICD-10-CM

## 2019-07-20 DIAGNOSIS — N60.91 UNSPECIFIED BENIGN MAMMARY DYSPLASIA OF RIGHT BREAST: Chronic | ICD-10-CM

## 2019-07-20 DIAGNOSIS — Z98.84 BARIATRIC SURGERY STATUS: Chronic | ICD-10-CM

## 2019-07-20 PROCEDURE — 99053 MED SERV 10PM-8AM 24 HR FAC: CPT

## 2019-07-20 PROCEDURE — 99283 EMERGENCY DEPT VISIT LOW MDM: CPT

## 2019-07-20 RX ORDER — IBUPROFEN 200 MG
1 TABLET ORAL
Qty: 20 | Refills: 0
Start: 2019-07-20

## 2019-07-20 RX ORDER — LIDOCAINE 4 G/100G
1 CREAM TOPICAL
Qty: 5 | Refills: 0
Start: 2019-07-20

## 2019-07-20 RX ORDER — CYCLOBENZAPRINE HYDROCHLORIDE 10 MG/1
1 TABLET, FILM COATED ORAL
Qty: 10 | Refills: 0
Start: 2019-07-20

## 2019-07-20 RX ORDER — IBUPROFEN 200 MG
600 TABLET ORAL ONCE
Refills: 0 | Status: COMPLETED | OUTPATIENT
Start: 2019-07-20 | End: 2019-07-20

## 2019-07-20 RX ADMIN — Medication 600 MILLIGRAM(S): at 08:20

## 2019-07-20 NOTE — ED ADULT NURSE NOTE - OBJECTIVE STATEMENT
Patient present to ED with c/o right sided back pain radiating down right leg on pain scale at present time 5/10 sharp in sensation. Took motrin last night

## 2019-07-20 NOTE — ED ADULT NURSE NOTE - CHPI ED NUR SYMPTOMS NEG
no tingling/no difficulty bearing weight/no numbness/no fatigue/no bladder dysfunction/no bowel dysfunction/no constipation/no motor function loss/no anorexia/no neck tenderness

## 2019-07-20 NOTE — ED PROVIDER NOTE - CLINICAL SUMMARY MEDICAL DECISION MAKING FREE TEXT BOX
patient presenting with neck pain. no midline neck pain or neuro deficit. no midline ttp. appears to be msk in nature. will give pain med, return precaution and instruction to f.u pmd

## 2019-07-20 NOTE — ED ADULT NURSE NOTE - NSIMPLEMENTINTERV_GEN_ALL_ED
Implemented All Fall with Harm Risk Interventions:  [place] to call system. Call bell, personal items and telephone within reach. Instruct patient to call for assistance. Room bathroom lighting operational. Non-slip footwear when patient is off stretcher. Physically safe environment: no spills, clutter or unnecessary equipment. Stretcher in lowest position, wheels locked, appropriate side rails in place. Provide visual cue, wrist band, yellow gown, etc. Monitor gait and stability. Monitor for mental status changes and reorient to person, place, and time. Review medications for side effects contributing to fall risk. Reinforce activity limits and safety measures with patient and family. Provide visual clues: red socks.

## 2019-07-20 NOTE — ED PROVIDER NOTE - OBJECTIVE STATEMENT
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 denies midline neck pain, focal weakness, numbness, fall, head injury, cp, sob, fever, chills, cough or neck stiffness. endorses pain worse with palpation and movement but no limited rom.

## 2019-07-20 NOTE — ED ADULT NURSE NOTE - ED STAT RN HANDOFF DETAILS
Patient discharged home as per MD order. All discharge instructions , F/U visits provided to patient. Prescriptions sent to pharmacy. Patient verbalizes understanding leaving ambulatory in no avute distress.
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
## 2020-07-06 NOTE — ASU PREOP CHECKLIST - PATIENT SENT TO
----- Message from Isaac Vora MD sent at 7/5/2020  5:14 PM CDT -----  a1c is much improved at 7.3--sugars average 163 over last 2 months; normal thyroid test; cmp shows normal sodium, potassium and calcium; fbs of 93; normal kidney function for her age; normal liver function; globulin level is high again--was normal 2 years ago and high 3 years ago--get serum protein electrophoresis; lipid panel looks good except for low levels of good cholesterol--go over numbers; cbc shows normal rbc, wbc and platelet count; sridhar shows very slight increase in microalbumin; u/a shows few wbcs and bacteria--culture was negative; no change in meds.  
Patient is notified of lab results , values are given. She will get a SPE done  
Patient returning call regarding below.  Patient is at [phone] any time.  
operating room

## 2020-08-05 NOTE — ED ADULT NURSE NOTE - CCCP TRG CHIEF CMPLNT
----- Message from Shelly Sotelo MD sent at 8/5/2020  1:36 PM CDT -----  Please let him take it twice a day.  ----- Message -----  From: Bere Villanueva MA  Sent: 8/5/2020  11:10 AM CDT  To: Shelly Sotelo MD    Good morning,    Pt wanted me to let you know that he already takes 80mg of lasix. Just a fyi..    Thanks         wrist pain/injury
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
## 2021-06-02 ENCOUNTER — APPOINTMENT (OUTPATIENT)
Dept: SURGERY | Facility: CLINIC | Age: 46
End: 2021-06-02
Payer: COMMERCIAL

## 2021-06-02 VITALS
DIASTOLIC BLOOD PRESSURE: 89 MMHG | SYSTOLIC BLOOD PRESSURE: 130 MMHG | HEIGHT: 64 IN | BODY MASS INDEX: 44.39 KG/M2 | WEIGHT: 260 LBS | HEART RATE: 87 BPM

## 2021-06-02 DIAGNOSIS — K21.9 GASTRO-ESOPHAGEAL REFLUX DISEASE W/OUT ESOPHAGITIS: ICD-10-CM

## 2021-06-02 DIAGNOSIS — Z78.9 OTHER SPECIFIED HEALTH STATUS: ICD-10-CM

## 2021-06-02 DIAGNOSIS — M19.90 UNSPECIFIED OSTEOARTHRITIS, UNSPECIFIED SITE: ICD-10-CM

## 2021-06-02 PROCEDURE — 99072 ADDL SUPL MATRL&STAF TM PHE: CPT

## 2021-06-02 PROCEDURE — 99214 OFFICE O/P EST MOD 30 MIN: CPT

## 2021-06-02 NOTE — REVIEW OF SYSTEMS
[Joint Pain] : joint pain [Joint Stiffness] : joint stiffness [Muscle Pain] : muscle pain [Negative] : Allergic/Immunologic [Patient Intake Form Reviewed] : Patient intake form was reviewed [Recent Change In Weight] : ~T recent weight change [Fever] : no fever [Chills] : no chills [Fatigue] : no fatigue [Vision Problems] : no vision problems [Dysphagia] : no dysphagia [Chest Pain] : no chest pain [Palpitations] : no palpitations [Shortness Of Breath] : no shortness of breath [Wheezing] : no wheezing [Abdominal Pain] : no abdominal pain [Vomiting] : no vomiting [Constipation] : no constipation [Reflux/Heartburn] : no reflux/ heartburn [Hernia] : no hernia [Dysuria] : no dysuria [Incontinence] : no incontinence [FreeTextEntry9] : back pain

## 2021-06-02 NOTE — PLAN
[FreeTextEntry1] : I have had a lengthy conversation with the patient and have discussed my impression and treatment plan with the patient. \par The risks, benefits and alternatives, including laparoscopic gastric bypass, and laparoscopic vertical sleeve gastrectomy, were discussed at length and all of his questions were answered. The patient appears to understand and wishes to proceed.\par \par The patient was given the following instructions:\par \par 1.	Patient needs a complete medical evaluation including echocardiogram, stress test, pulmonary function test and any additional indicated tests.\par 2.	Patient needs evaluation by GI including an upper endoscopy.\par 3.	Patient needs nutritional and psychological evaluations.\par 4.	Patient must attend a preoperative information meeting.\par 5.	Patient must undergo a right upper quadrant ultrasound, if there is no history of prior cholecystectomy.\par \par The weight loss surgery information packet as well as the nutrition education packet have been reviewed and given to the patient, and I provided and reviewed detailed documents addressing these same topics. I have provided literature about the procedures and encouraged the patient to further research the surgeries, and patient feels well informed. I also provided patient with detailed information regarding the pre-operative requirements with respect to testing, medical, nutritional and psychological evaluations and documentation of same. \par \par Also discussed was importance of taking supplements and attending regular follow-up. I reviewed importance of behavioral modification and follow-up in order to optimize outcomes and avoid complications. The patient is aware of the expected length of stay and discharge plan for a sleeve gastrectomy. I encouraged the patient to maintain a diet and exercise program to promote optimum health for the surgical procedure and post-op course. \par \par The patient clearly understood that surgery would only be scheduled if there are no medical or psychiatric contraindications and that follow up pre-operative office visits are required. Patient agrees to begin a multi-disciplinary evaluation as discussed and provide required documentation and progress. I informed patient that once all testing and evaluations (as deemed necessary) are completed, reviewed, and documentation received, this information to justify medical necessity for weight loss surgery will be submitted to insurance for pre-certification. \par \par Patient will begin the pre-operative process with a visit to PCP, for whom detailed information regarding the necessary evaluations and documentation of obesity-related medical care was given to patient to share with PCP. \par \par The patient had the opportunity to ask pertinent questions which were answered. All of patient’s questions and concerns were addressed to patient’s satisfaction, and patient verbalized an understanding of the information discussed.\par

## 2021-06-02 NOTE — PHYSICAL EXAM
[Obese, well nourished, in no acute distress] : obese, well nourished, in no acute distress [Normal] : affect appropriate [de-identified] : Obese, protuberant. Soft, nontender, nondistended, no rebound or guarding.

## 2021-06-02 NOTE — HISTORY OF PRESENT ILLNESS
[de-identified] : Ms. SHANON VELAZQUEZ  presents today  for bariatric surgery consultation. she has a long-standing history of severe obesity refractory to multiple prior attempts at weight loss including conservative treatments of diet and exercise.  Ms. SHANON VELAZQUEZ has been obese since her teenage years and the most weight that she  has been able to lose by any means is negligible.   Previous weight loss efforts include: Calorie-counting, restricted intake and  lap band procedure in 2007.  she states that she was able to loose weight after her surgery but had to have the lap band removed because she developed severe gastric reflux.  Ms. VELAZQUEZ has limited capacity for exercise due to excess weight and her back surgery.  she  feels that weight loss surgery is the only option at this point for effective and clinically meaningful weight loss. she  is interested in sleeve gastrectomy.

## 2021-06-02 NOTE — ASSESSMENT
[FreeTextEntry1] : Clinical Impression\par Patient is a 45 year  old female with a BMI of 44.6 which places patient in the morbidly obese category. This has directly contributed to patient's current medical conditions as well as, a decreased quality of life. Patient has very little chance of successfully losing and maintaining a significant amount of weight with non-surgical management, and would benefit from surgical intervention. Patient meets the criteria for weight loss surgery as defined in the NIH consensus statement, surgery is medically necessary. \par Given patient’s current BMI and obesity-related comorbidities, I feel the patient is a candidate for and would benefit from weight loss surgery, as all prior attempts by non-surgical means have been futile.

## 2021-06-22 ENCOUNTER — OUTPATIENT (OUTPATIENT)
Dept: OUTPATIENT SERVICES | Facility: HOSPITAL | Age: 46
LOS: 1 days | End: 2021-06-22
Payer: COMMERCIAL

## 2021-06-22 DIAGNOSIS — Z98.89 OTHER SPECIFIED POSTPROCEDURAL STATES: Chronic | ICD-10-CM

## 2021-06-22 DIAGNOSIS — Z98.890 OTHER SPECIFIED POSTPROCEDURAL STATES: Chronic | ICD-10-CM

## 2021-06-22 DIAGNOSIS — N60.91 UNSPECIFIED BENIGN MAMMARY DYSPLASIA OF RIGHT BREAST: Chronic | ICD-10-CM

## 2021-06-22 DIAGNOSIS — R06.02 SHORTNESS OF BREATH: ICD-10-CM

## 2021-06-22 DIAGNOSIS — Z98.84 BARIATRIC SURGERY STATUS: Chronic | ICD-10-CM

## 2021-06-22 PROCEDURE — 78452 HT MUSCLE IMAGE SPECT MULT: CPT

## 2021-06-22 PROCEDURE — 93017 CV STRESS TEST TRACING ONLY: CPT

## 2021-06-22 PROCEDURE — A9502: CPT

## 2021-07-01 ENCOUNTER — APPOINTMENT (OUTPATIENT)
Dept: GASTROENTEROLOGY | Facility: CLINIC | Age: 46
End: 2021-07-01
Payer: COMMERCIAL

## 2021-07-01 VITALS
TEMPERATURE: 97.1 F | BODY MASS INDEX: 45.58 KG/M2 | DIASTOLIC BLOOD PRESSURE: 80 MMHG | SYSTOLIC BLOOD PRESSURE: 113 MMHG | HEIGHT: 64 IN | HEART RATE: 97 BPM | OXYGEN SATURATION: 97 % | WEIGHT: 267 LBS

## 2021-07-01 PROCEDURE — 99203 OFFICE O/P NEW LOW 30 MIN: CPT

## 2021-07-01 PROCEDURE — 99072 ADDL SUPL MATRL&STAF TM PHE: CPT

## 2021-07-05 NOTE — PHYSICAL EXAM
[General Appearance - Alert] : alert [General Appearance - In No Acute Distress] : in no acute distress [Sclera] : the sclera and conjunctiva were normal [PERRL With Normal Accommodation] : pupils were equal in size, round, and reactive to light [Extraocular Movements] : extraocular movements were intact [Outer Ear] : the ears and nose were normal in appearance [Oropharynx] : the oropharynx was normal [Neck Appearance] : the appearance of the neck was normal [Neck Cervical Mass (___cm)] : no neck mass was observed [Thyroid Diffuse Enlargement] : the thyroid was not enlarged [Jugular Venous Distention Increased] : there was no jugular-venous distention [Thyroid Nodule] : there were no palpable thyroid nodules [Heart Rate And Rhythm] : heart rate was normal and rhythm regular [Auscultation Breath Sounds / Voice Sounds] : lungs were clear to auscultation bilaterally [Heart Sounds] : normal S1 and S2 [Murmurs] : no murmurs [Heart Sounds Gallop] : no gallops [Heart Sounds Pericardial Friction Rub] : no pericardial rub [Full Pulse] : the pedal pulses are present [Edema] : there was no peripheral edema [Bowel Sounds] : normal bowel sounds [Abdomen Soft] : soft [Abdomen Tenderness] : non-tender [] : no hepato-splenomegaly [Abdomen Mass (___ Cm)] : no abdominal mass palpated

## 2021-07-05 NOTE — HISTORY OF PRESENT ILLNESS
[de-identified] : This patient is 45 years old with obesity, GERD, sleep apnea, gastric banding 2008 that was removed. She is here for pre-gastric sleeve surgery. She has gained 7 pounds recently. She is not exercising. Regular bowel movements with her. She has no abdominal tenderness. She takes Naprosyn. She has no allergies to food. She has no diabetes. She had a cholecystectomy 2 years ago. She needs an EGD for pre-bariatric workup

## 2021-07-05 NOTE — ASSESSMENT
[FreeTextEntry1] : This patient is going for gastric sleeve and needs an EGD prior. We will set that up as soon as possible

## 2021-07-07 ENCOUNTER — APPOINTMENT (OUTPATIENT)
Dept: SURGERY | Facility: CLINIC | Age: 46
End: 2021-07-07
Payer: COMMERCIAL

## 2021-07-07 ENCOUNTER — APPOINTMENT (OUTPATIENT)
Dept: BARIATRICS | Facility: CLINIC | Age: 46
End: 2021-07-07

## 2021-07-07 VITALS
BODY MASS INDEX: 45.07 KG/M2 | DIASTOLIC BLOOD PRESSURE: 85 MMHG | HEART RATE: 99 BPM | HEIGHT: 64 IN | OXYGEN SATURATION: 98 % | WEIGHT: 264 LBS | SYSTOLIC BLOOD PRESSURE: 135 MMHG

## 2021-07-07 VITALS — TEMPERATURE: 97.1 F

## 2021-07-07 PROCEDURE — 99072 ADDL SUPL MATRL&STAF TM PHE: CPT

## 2021-07-07 PROCEDURE — 99213 OFFICE O/P EST LOW 20 MIN: CPT

## 2021-07-07 NOTE — PHYSICAL EXAM
[Obese, well nourished, in no acute distress] : obese, well nourished, in no acute distress [Normal] : affect appropriate [de-identified] : Obese, protuberant. Soft, nontender, nondistended, no rebound or guarding.

## 2021-07-07 NOTE — PLAN
[FreeTextEntry1] :  Patient will obtain any outstanding evaluations in short order. Once again the pre-op requirements/evaluations and any available results were reviewed. \par \par We will collect and review any available/additional results and records of the multi-disciplinary evaluation. I encouraged patient to bring copies of all completed testing/evaluations to the next office visit with me. \par \par I encouraged the patient to continue a diet and exercise program to promote optimum health for the surgical procedure and post-op course.\par \par Patient voiced understanding of these behavioral recommendations and agrees to practice them with the understanding that success with these behaviors will be assessed at future visits. \par \par Patient’s questions and concerns addressed to patient's satisfaction and patient verbalized an understanding of the information discussed.\par  \par She will return to see me in one month.

## 2021-07-07 NOTE — REVIEW OF SYSTEMS
[Negative] : Allergic/Immunologic [Patient Intake Form Reviewed] : Patient intake form was reviewed [Recent Change In Weight] : ~T recent weight change [Joint Pain] : joint pain [Joint Stiffness] : joint stiffness [Muscle Pain] : muscle pain [Fever] : no fever [Chills] : no chills [Fatigue] : no fatigue [Vision Problems] : no vision problems [Dysphagia] : no dysphagia [Chest Pain] : no chest pain [Palpitations] : no palpitations [Shortness Of Breath] : no shortness of breath [Wheezing] : no wheezing [Abdominal Pain] : no abdominal pain [Vomiting] : no vomiting [Constipation] : no constipation [Reflux/Heartburn] : no reflux/ heartburn [Hernia] : no hernia [Dysuria] : no dysuria [Incontinence] : no incontinence [FreeTextEntry9] : back pain

## 2021-07-07 NOTE — CONSULT LETTER
[Dear  ___] : Dear  [unfilled], [Courtesy Letter:] : I had the pleasure of seeing your patient, [unfilled], in my office today. [Please see my note below.] : Please see my note below. [Consult Closing:] : Thank you very much for allowing me to participate in the care of this patient.  If you have any questions, please do not hesitate to contact me. [Sincerely,] : Sincerely, [FreeTextEntry3] : Chavo

## 2021-07-07 NOTE — ASSESSMENT
[FreeTextEntry1] : Patient remains an excellent candidate for weight loss surgery, given the presence/risk of developing or worsening of multiple obesity-related comorbidities, and remains committed to proceeding with weight loss surgery.\par \par Patient is in the process of obtaining the required pre-operative evaluations, which for this patient include:\par Nutrition\par Psych\par GI\par  Cardiology\par Pulmonary

## 2021-07-07 NOTE — DATA REVIEWED
[No studies available for review at this time.] : No studies available for review at this time. [FreeTextEntry1] : Cardiology and stress test that was normal,  \par Psych. \par GI,  She has EGD scheduled for  07/28/2021.\par She is scheduled to see  Pulmonary specialist on 07/20/2021.

## 2021-07-07 NOTE — HISTORY OF PRESENT ILLNESS
[de-identified] : Ms. SHANON VELAZQUEZ  is here for monthly pre-operative follow-up and weight management program in preparation for bariatric surgery. she  is making good food choices, working on eating smaller portions and becoming more mindful. Ms. VELAZQUEZ  has made a concerted effort to eat more balanced and nutritionally sound meals, and to engage in some level of physical activity on a regular basis. she  continues to struggle with weight loss despite continuous concerted efforts since the prior visit.\par \par Patient has initiated the following evaluations: Cardiology and had a stress test that was normal,  Psych. GI,  She has EGD scheduled for  07/28/2021. Patient has also seen our bariatric program coordinator and nutritionist. She has attended one of our bariatric information session.  she has a history of ABRAM and states that she has not been consistent in using her CPAP machine. she is scheduled to see  Pulmonary specialist on 07/20/2021.   [de-identified] : Patient reports no interval changes to medications, medical history or overall health status.\par

## 2021-07-11 DIAGNOSIS — Z01.818 ENCOUNTER FOR OTHER PREPROCEDURAL EXAMINATION: ICD-10-CM

## 2021-07-16 ENCOUNTER — APPOINTMENT (OUTPATIENT)
Dept: DISASTER EMERGENCY | Facility: CLINIC | Age: 46
End: 2021-07-16

## 2021-07-17 LAB — SARS-COV-2 N GENE NPH QL NAA+PROBE: NOT DETECTED

## 2021-07-20 ENCOUNTER — APPOINTMENT (OUTPATIENT)
Dept: PULMONOLOGY | Facility: CLINIC | Age: 46
End: 2021-07-20
Payer: COMMERCIAL

## 2021-07-20 DIAGNOSIS — M48.061 SPINAL STENOSIS, LUMBAR REGION WITHOUT NEUROGENIC CLAUDICATION: ICD-10-CM

## 2021-07-20 DIAGNOSIS — Z82.49 FAMILY HISTORY OF ISCHEMIC HEART DISEASE AND OTHER DISEASES OF THE CIRCULATORY SYSTEM: ICD-10-CM

## 2021-07-20 DIAGNOSIS — Z82.3 FAMILY HISTORY OF STROKE: ICD-10-CM

## 2021-07-20 DIAGNOSIS — Z01.811 ENCOUNTER FOR PREPROCEDURAL RESPIRATORY EXAMINATION: ICD-10-CM

## 2021-07-20 PROCEDURE — 94726 PLETHYSMOGRAPHY LUNG VOLUMES: CPT

## 2021-07-20 PROCEDURE — 99072 ADDL SUPL MATRL&STAF TM PHE: CPT

## 2021-07-20 PROCEDURE — 94010 BREATHING CAPACITY TEST: CPT

## 2021-07-20 PROCEDURE — 71046 X-RAY EXAM CHEST 2 VIEWS: CPT

## 2021-07-20 PROCEDURE — ZZZZZ: CPT

## 2021-07-20 PROCEDURE — 99204 OFFICE O/P NEW MOD 45 MIN: CPT | Mod: 25

## 2021-07-20 PROCEDURE — 94729 DIFFUSING CAPACITY: CPT

## 2021-07-21 PROBLEM — Z82.49 FAMILY HISTORY OF CONGESTIVE HEART FAILURE: Status: ACTIVE | Noted: 2021-07-20

## 2021-07-21 PROBLEM — Z01.811 PREOP RESPIRATORY EXAM: Status: ACTIVE | Noted: 2021-07-21

## 2021-07-21 PROBLEM — Z82.3 FAMILY HISTORY OF CEREBROVASCULAR ACCIDENT (CVA): Status: ACTIVE | Noted: 2021-07-20

## 2021-07-21 NOTE — REASON FOR VISIT
[Sleep Apnea] : sleep apnea [Pre-op Risk Stratification] : pre-op risk stratification [Initial] : an initial visit

## 2021-07-24 DIAGNOSIS — Z01.818 ENCOUNTER FOR OTHER PREPROCEDURAL EXAMINATION: ICD-10-CM

## 2021-07-25 ENCOUNTER — APPOINTMENT (OUTPATIENT)
Dept: DISASTER EMERGENCY | Facility: CLINIC | Age: 46
End: 2021-07-25

## 2021-07-26 ENCOUNTER — NON-APPOINTMENT (OUTPATIENT)
Age: 46
End: 2021-07-26

## 2021-07-26 ENCOUNTER — APPOINTMENT (OUTPATIENT)
Dept: CARDIOLOGY | Facility: CLINIC | Age: 46
End: 2021-07-26
Payer: COMMERCIAL

## 2021-07-26 VITALS
SYSTOLIC BLOOD PRESSURE: 109 MMHG | TEMPERATURE: 97.2 F | DIASTOLIC BLOOD PRESSURE: 77 MMHG | HEIGHT: 64 IN | RESPIRATION RATE: 16 BRPM | BODY MASS INDEX: 44.22 KG/M2 | WEIGHT: 259 LBS | OXYGEN SATURATION: 98 % | HEART RATE: 89 BPM

## 2021-07-26 DIAGNOSIS — G47.30 SLEEP APNEA, UNSPECIFIED: ICD-10-CM

## 2021-07-26 LAB — SARS-COV-2 N GENE NPH QL NAA+PROBE: NOT DETECTED

## 2021-07-26 PROCEDURE — 99205 OFFICE O/P NEW HI 60 MIN: CPT

## 2021-07-26 PROCEDURE — 99072 ADDL SUPL MATRL&STAF TM PHE: CPT

## 2021-07-26 PROCEDURE — 93000 ELECTROCARDIOGRAM COMPLETE: CPT

## 2021-07-27 ENCOUNTER — TRANSCRIPTION ENCOUNTER (OUTPATIENT)
Age: 46
End: 2021-07-27

## 2021-07-28 ENCOUNTER — APPOINTMENT (OUTPATIENT)
Dept: GASTROENTEROLOGY | Facility: HOSPITAL | Age: 46
End: 2021-07-28

## 2021-07-28 ENCOUNTER — APPOINTMENT (OUTPATIENT)
Dept: ULTRASOUND IMAGING | Facility: HOSPITAL | Age: 46
End: 2021-07-28

## 2021-07-28 ENCOUNTER — OUTPATIENT (OUTPATIENT)
Dept: OUTPATIENT SERVICES | Facility: HOSPITAL | Age: 46
LOS: 1 days | End: 2021-07-28
Payer: COMMERCIAL

## 2021-07-28 ENCOUNTER — RESULT REVIEW (OUTPATIENT)
Age: 46
End: 2021-07-28

## 2021-07-28 DIAGNOSIS — Z98.89 OTHER SPECIFIED POSTPROCEDURAL STATES: Chronic | ICD-10-CM

## 2021-07-28 DIAGNOSIS — Z98.84 BARIATRIC SURGERY STATUS: Chronic | ICD-10-CM

## 2021-07-28 DIAGNOSIS — Z98.890 OTHER SPECIFIED POSTPROCEDURAL STATES: Chronic | ICD-10-CM

## 2021-07-28 DIAGNOSIS — E66.01 MORBID (SEVERE) OBESITY DUE TO EXCESS CALORIES: ICD-10-CM

## 2021-07-28 DIAGNOSIS — N60.91 UNSPECIFIED BENIGN MAMMARY DYSPLASIA OF RIGHT BREAST: Chronic | ICD-10-CM

## 2021-07-28 PROCEDURE — 76700 US EXAM ABDOM COMPLETE: CPT | Mod: 26

## 2021-07-28 PROCEDURE — 88312 SPECIAL STAINS GROUP 1: CPT | Mod: 26

## 2021-07-28 PROCEDURE — 43239 EGD BIOPSY SINGLE/MULTIPLE: CPT

## 2021-07-28 PROCEDURE — 88305 TISSUE EXAM BY PATHOLOGIST: CPT

## 2021-07-28 PROCEDURE — 88305 TISSUE EXAM BY PATHOLOGIST: CPT | Mod: 26

## 2021-07-28 PROCEDURE — 76700 US EXAM ABDOM COMPLETE: CPT

## 2021-07-28 PROCEDURE — 88312 SPECIAL STAINS GROUP 1: CPT

## 2021-07-28 NOTE — PROGRESS NOTE ADULT - SUBJECTIVE AND OBJECTIVE BOX
Esophagogastroduodenoscopy Report  Indication: Pre Bariatric  Referring MD:   Instrument:  #  Anesthesia: MAC  Consent:  Informed consent was obtained from the patient after providing any opportunity for questions  Procedure: The gastroscope was gently passed through the incisoral orifice into the oral cavity and under direct visualization the esophagus was intubated. The endoscope was passed down the esophagus, through the stomach and into proximal jejunum. Color, texture, mucosa and anatomy of the esophagus, stomach, and duodenum were carefully examined with the scope. The patient tolerated the procedure well. After completion of the examination, the patient was transferred to the recovery room.   Preparation: NPO   Findings:   Oropharynx	Normal  Esophagus	Normal  EG-junction	Normal  Cardia	Normal.  Body	Normal   Antrum	Normal, biopsy taken  Pylorus	Normal.  Duodenal Bulb	Normal   2nd portion	Normal  3rd portion	Normal.  Date and time:    EBL:0  Impression: Normal EGD    Plan: F/U biopsy                              Procedure Start Time: 11:45  Procedure End Time:   11:48                                  Attending:   Dima Canales MD

## 2021-07-30 LAB — SURGICAL PATHOLOGY STUDY: SIGNIFICANT CHANGE UP

## 2021-08-09 ENCOUNTER — OUTPATIENT (OUTPATIENT)
Dept: OUTPATIENT SERVICES | Facility: HOSPITAL | Age: 46
LOS: 1 days | End: 2021-08-09
Payer: COMMERCIAL

## 2021-08-09 DIAGNOSIS — Z98.84 BARIATRIC SURGERY STATUS: Chronic | ICD-10-CM

## 2021-08-09 DIAGNOSIS — Z98.89 OTHER SPECIFIED POSTPROCEDURAL STATES: Chronic | ICD-10-CM

## 2021-08-09 DIAGNOSIS — R94.39 ABNORMAL RESULT OF OTHER CARDIOVASCULAR FUNCTION STUDY: ICD-10-CM

## 2021-08-09 DIAGNOSIS — Z98.890 OTHER SPECIFIED POSTPROCEDURAL STATES: Chronic | ICD-10-CM

## 2021-08-09 DIAGNOSIS — N60.91 UNSPECIFIED BENIGN MAMMARY DYSPLASIA OF RIGHT BREAST: Chronic | ICD-10-CM

## 2021-08-09 PROCEDURE — 93306 TTE W/DOPPLER COMPLETE: CPT

## 2021-08-09 PROCEDURE — 93306 TTE W/DOPPLER COMPLETE: CPT | Mod: 26

## 2021-08-11 ENCOUNTER — APPOINTMENT (OUTPATIENT)
Dept: BARIATRICS | Facility: CLINIC | Age: 46
End: 2021-08-11
Payer: COMMERCIAL

## 2021-08-11 ENCOUNTER — APPOINTMENT (OUTPATIENT)
Dept: SURGERY | Facility: CLINIC | Age: 46
End: 2021-08-11
Payer: COMMERCIAL

## 2021-08-11 ENCOUNTER — LABORATORY RESULT (OUTPATIENT)
Age: 46
End: 2021-08-11

## 2021-08-11 VITALS
HEIGHT: 64 IN | WEIGHT: 263 LBS | HEART RATE: 94 BPM | OXYGEN SATURATION: 99 % | DIASTOLIC BLOOD PRESSURE: 79 MMHG | SYSTOLIC BLOOD PRESSURE: 114 MMHG | BODY MASS INDEX: 44.9 KG/M2

## 2021-08-11 VITALS — TEMPERATURE: 97.2 F

## 2021-08-11 PROCEDURE — XXXXX: CPT

## 2021-08-11 PROCEDURE — 99213 OFFICE O/P EST LOW 20 MIN: CPT

## 2021-08-11 NOTE — ASSESSMENT
[FreeTextEntry1] : Patient remains an excellent candidate for weight loss surgery, given the presence/risk of developing or worsening of multiple obesity-related comorbidities, and remains committed to proceeding with weight loss surgery.\par \par Patient is in the process of obtaining the required pre-operative evaluations, which for this patient include:\par Nutrition \par  Cardiology\par Pulmonary. \par labs

## 2021-08-11 NOTE — DATA REVIEWED
[FreeTextEntry1] : Cardiology - CTA and echo pending\par Nutrition - follow up today\par Pulmonary - awaiting receipt of CPAP device\par GI - EGD completed

## 2021-08-11 NOTE — PLAN
[FreeTextEntry1] :  Patient will obtain any outstanding evaluations in short order. Once again the pre-op requirements/evaluations and any available results were reviewed. \par \par We will collect and review any available/additional results and records of the multi-disciplinary evaluation. I encouraged patient to bring copies of all completed testing/evaluations to the next office visit with me. \par \par I encouraged the patient to continue a diet and exercise program to promote optimum health for the surgical procedure and post-op course.\par \par Patient voiced understanding of these behavioral recommendations and agrees to practice them with the understanding that success with these behaviors will be assessed at future visits. \par \par Patient’s questions and concerns addressed to patient's satisfaction and patient verbalized an understanding of the information discussed.\par  \par She will return to see me in one month, after her pulmonary evaluation is complete.

## 2021-08-11 NOTE — PHYSICAL EXAM
[Obese, well nourished, in no acute distress] : obese, well nourished, in no acute distress [Normal] : affect appropriate [de-identified] : Obese, protuberant. Soft, nontender, nondistended, no rebound or guarding.

## 2021-08-11 NOTE — REVIEW OF SYSTEMS
[Recent Change In Weight] : ~T recent weight change [Patient Intake Form Reviewed] : Patient intake form was reviewed [Joint Pain] : joint pain [Joint Stiffness] : joint stiffness [Muscle Pain] : muscle pain [Negative] : Constitutional [Fever] : no fever [Chills] : no chills [Fatigue] : no fatigue [Vision Problems] : no vision problems [Dysphagia] : no dysphagia [Chest Pain] : no chest pain [Palpitations] : no palpitations [Shortness Of Breath] : no shortness of breath [Wheezing] : no wheezing [Abdominal Pain] : no abdominal pain [Vomiting] : no vomiting [Constipation] : no constipation [Reflux/Heartburn] : no reflux/ heartburn [Hernia] : no hernia [Dysuria] : no dysuria [Incontinence] : no incontinence [FreeTextEntry9] : back pain

## 2021-08-12 LAB
24R-OH-CALCIDIOL SERPL-MCNC: 47.3 PG/ML
ALBUMIN SERPL ELPH-MCNC: 4.9 G/DL
ALP BLD-CCNC: 87 U/L
ALT SERPL-CCNC: 20 U/L
ANION GAP SERPL CALC-SCNC: 21 MMOL/L
AST SERPL-CCNC: 31 U/L
BASOPHILS # BLD AUTO: 0.06 K/UL
BASOPHILS NFR BLD AUTO: 0.9 %
BILIRUB SERPL-MCNC: 0.3 MG/DL
BUN SERPL-MCNC: 10 MG/DL
CALCIUM SERPL-MCNC: 10.7 MG/DL
CALCIUM SERPL-MCNC: 10.7 MG/DL
CHLORIDE SERPL-SCNC: 105 MMOL/L
CHOLEST SERPL-MCNC: 211 MG/DL
CO2 SERPL-SCNC: 19 MMOL/L
CREAT SERPL-MCNC: 0.9 MG/DL
EOSINOPHIL # BLD AUTO: 0.04 K/UL
EOSINOPHIL NFR BLD AUTO: 0.6 %
ESTIMATED AVERAGE GLUCOSE: 128 MG/DL
FOLATE SERPL-MCNC: <2 NG/ML
GLUCOSE SERPL-MCNC: 74 MG/DL
HBA1C MFR BLD HPLC: 6.1 %
HCT VFR BLD CALC: 45.1 %
HDLC SERPL-MCNC: 62 MG/DL
HGB BLD-MCNC: 13.8 G/DL
IMM GRANULOCYTES NFR BLD AUTO: 0.1 %
IRON SERPL-MCNC: 72 UG/DL
LDLC SERPL CALC-MCNC: 132 MG/DL
LYMPHOCYTES # BLD AUTO: 3.41 K/UL
LYMPHOCYTES NFR BLD AUTO: 48.9 %
MAGNESIUM SERPL-MCNC: 2.2 MG/DL
MAN DIFF?: NORMAL
MCHC RBC-ENTMCNC: 28.2 PG
MCHC RBC-ENTMCNC: 30.6 GM/DL
MCV RBC AUTO: 92.2 FL
MONOCYTES # BLD AUTO: 0.51 K/UL
MONOCYTES NFR BLD AUTO: 7.3 %
NEUTROPHILS # BLD AUTO: 2.95 K/UL
NEUTROPHILS NFR BLD AUTO: 42.2 %
NONHDLC SERPL-MCNC: 149 MG/DL
PARATHYROID HORMONE INTACT: 29 PG/ML
PLATELET # BLD AUTO: 529 K/UL
POTASSIUM SERPL-SCNC: 5.2 MMOL/L
PROT SERPL-MCNC: 8 G/DL
RBC # BLD: 4.89 M/UL
RBC # FLD: 14.9 %
SODIUM SERPL-SCNC: 145 MMOL/L
TRIGL SERPL-MCNC: 85 MG/DL
TSH SERPL-ACNC: 0.01 UIU/ML
VIT B12 SERPL-MCNC: <150 PG/ML
WBC # FLD AUTO: 6.98 K/UL

## 2021-08-16 ENCOUNTER — APPOINTMENT (OUTPATIENT)
Dept: CARDIOLOGY | Facility: CLINIC | Age: 46
End: 2021-08-16

## 2021-08-16 ENCOUNTER — APPOINTMENT (OUTPATIENT)
Dept: CT IMAGING | Facility: CLINIC | Age: 46
End: 2021-08-16

## 2021-08-16 ENCOUNTER — OUTPATIENT (OUTPATIENT)
Dept: OUTPATIENT SERVICES | Facility: HOSPITAL | Age: 46
LOS: 1 days | End: 2021-08-16
Payer: COMMERCIAL

## 2021-08-16 ENCOUNTER — NON-APPOINTMENT (OUTPATIENT)
Age: 46
End: 2021-08-16

## 2021-08-16 ENCOUNTER — RESULT REVIEW (OUTPATIENT)
Age: 46
End: 2021-08-16

## 2021-08-16 DIAGNOSIS — R94.39 ABNORMAL RESULT OF OTHER CARDIOVASCULAR FUNCTION STUDY: ICD-10-CM

## 2021-08-16 DIAGNOSIS — Z98.890 OTHER SPECIFIED POSTPROCEDURAL STATES: Chronic | ICD-10-CM

## 2021-08-16 DIAGNOSIS — Z98.89 OTHER SPECIFIED POSTPROCEDURAL STATES: Chronic | ICD-10-CM

## 2021-08-16 DIAGNOSIS — N60.91 UNSPECIFIED BENIGN MAMMARY DYSPLASIA OF RIGHT BREAST: Chronic | ICD-10-CM

## 2021-08-16 DIAGNOSIS — Z98.84 BARIATRIC SURGERY STATUS: Chronic | ICD-10-CM

## 2021-08-16 PROCEDURE — 75574 CT ANGIO HRT W/3D IMAGE: CPT

## 2021-08-16 PROCEDURE — 75574 CT ANGIO HRT W/3D IMAGE: CPT | Mod: 26

## 2021-09-07 ENCOUNTER — APPOINTMENT (OUTPATIENT)
Dept: CARDIOLOGY | Facility: CLINIC | Age: 46
End: 2021-09-07
Payer: COMMERCIAL

## 2021-09-07 DIAGNOSIS — Z01.810 ENCOUNTER FOR PREPROCEDURAL CARDIOVASCULAR EXAMINATION: ICD-10-CM

## 2021-09-07 PROCEDURE — 99214 OFFICE O/P EST MOD 30 MIN: CPT | Mod: 95

## 2021-09-07 NOTE — REVIEW OF SYSTEMS
[Weight Gain (___ Lbs)] : [unfilled] ~Ulb weight gain [SOB] : shortness of breath [Dyspnea on exertion] : dyspnea during exertion [Negative] : Heme/Lymph

## 2021-09-07 NOTE — REASON FOR VISIT
[FreeTextEntry1] : CTA coronary normal\par Echo nl LVEF, mild-mod MR\par No cardiac complaints since last visit\par Cleared for surgery\par \par Dear Dr. Schulz:\par \par I had the pleasure of re-evaluating your patient, Ms. Linda Reyez who you had kindly referred for for initial cardiovascular and pre-operative cardiovascular evaluation.  I last saw her in July 2021.\par \par As you know, she is a 47 yo woman with morbid obesity (BMI 45-46), ABRAM, arthritis, spinal stenosis, carpal tunnel syndrome, varicose veins s/p procedure, prior lap-band and removed 2018, who now presents for cardiovascular and pre-operative cardiovascular evaluation. From the cardiac perspective, she reports that she reports having exertional dyspnea. This prompted nuclear stress testing which noted perfusion defects in anterior and anteroapical walls suggestive of ischemia.  \par \par Of note, she also has a significant FH had a stroke at age ~65, mom has a history of CHF s/p ICD, sister with ?postpartum CM.\par \par Prior 12-lead ECG for SOB notes NSR, normal axis, normal intervals. Prior physical examination was otherwise unremarkable except for morbid obesity. She appeared euvolemic on exam.\par \par 1. Echocardiogram performed on to evaluate on 8/9/21 noted low-normal biventricular systolic function LVEF 50-55%, mild-moderate MR.\par 2. Coronary CT angiogram noted no evidence of obstructive CAD.\par 3. From the cardiac perspective, she may proceed with bariatric surgery without the need for additional cardiac testing or risk stratification.\par \par Dr. Schulz, thank you again for entrusting her care with me. \par \par Warmest regards,\par Raghav Da Silva\par \par

## 2021-09-15 ENCOUNTER — APPOINTMENT (OUTPATIENT)
Dept: SURGERY | Facility: CLINIC | Age: 46
End: 2021-09-15
Payer: COMMERCIAL

## 2021-09-15 ENCOUNTER — APPOINTMENT (OUTPATIENT)
Dept: BARIATRICS | Facility: CLINIC | Age: 46
End: 2021-09-15

## 2021-09-15 VITALS
BODY MASS INDEX: 45.41 KG/M2 | SYSTOLIC BLOOD PRESSURE: 132 MMHG | HEART RATE: 83 BPM | WEIGHT: 266 LBS | OXYGEN SATURATION: 99 % | HEIGHT: 64 IN | DIASTOLIC BLOOD PRESSURE: 92 MMHG

## 2021-09-15 VITALS — TEMPERATURE: 96.8 F

## 2021-09-15 PROCEDURE — 99214 OFFICE O/P EST MOD 30 MIN: CPT

## 2021-09-15 NOTE — HISTORY OF PRESENT ILLNESS
[de-identified] : Patient is here for monthly pre-operative follow-up and  weight management program in preparation for bariatric surgery.  Patient is making good food choices, working on eating smaller portions and becoming more mindful. \par Patient has made a concerted effort to eat more balanced and nutritionally sound meals, and to engage in some level of physical activity on a regular basis. \par Patient continues to struggle with weight loss despite continuous concerted efforts since the prior visit.  \par Patient remains interested in a sleeve gastrectomy.    \par Patient reports no interval changes to overall health status or medical history and has no additional complaints at this time.  \par \par Patient has completed the following evaluations: cardiology, GI, initial pulmonary .  She also had her sleep study and is awaiting her CPAP.\par \par  [de-identified] : Patient reports no interval changes to medications, medical history or overall health status.\par

## 2021-09-15 NOTE — REVIEW OF SYSTEMS
[Patient Intake Form Reviewed] : Patient intake form was reviewed [Recent Change In Weight] : ~T recent weight change [Joint Pain] : joint pain [Joint Stiffness] : joint stiffness [Muscle Pain] : muscle pain [Negative] : Allergic/Immunologic [Fever] : no fever [Chills] : no chills [Fatigue] : no fatigue [Vision Problems] : no vision problems [Dysphagia] : no dysphagia [Chest Pain] : no chest pain [Palpitations] : no palpitations [Shortness Of Breath] : no shortness of breath [Wheezing] : no wheezing [Abdominal Pain] : no abdominal pain [Vomiting] : no vomiting [Constipation] : no constipation [Reflux/Heartburn] : no reflux/ heartburn [Hernia] : no hernia [Dysuria] : no dysuria [Incontinence] : no incontinence [FreeTextEntry9] : back pain

## 2021-09-15 NOTE — ASSESSMENT
[FreeTextEntry1] : Patient remains an excellent candidate for weight loss surgery, given the  presence/risk of developing or worsening of  multiple obesity-related comorbidities, and remains committed to proceeding with weight loss surgery. \par \par Patient is in the process of obtaining  the required pre-operative evaluations, which for this patient include:\par Pulmonary\par Psych\par Additional visit and clearance with nutritionist\par \par \par

## 2021-09-15 NOTE — PLAN
[FreeTextEntry1] : Patient will obtain any outstanding evaluations in short order. Once again the pre-op requirements/evaluations and any available results were reviewed. \par \par We will collect and review any available/additional results and records of the multi-disciplinary evaluation.  I encouraged patient to bring copies of all completed testing/evaluations to the next office visit with me. \par \par I encouraged the patient to continue a diet and exercise program to promote optimum health for the surgical procedure and post-op course.\par \par Patient  voiced understanding of these behavioral recommendations and agrees to practice them with the understanding that success with these behaviors will be assessed at future visits. \par \par Patient’s questions and concerns addressed to patient's satisfaction and patient verbalized an understanding of the information discussed.\par \par She will return to see me in 1 month, at which time she will also see our nutritionist.

## 2021-09-15 NOTE — PHYSICAL EXAM
[Obese, well nourished, in no acute distress] : obese, well nourished, in no acute distress [Normal] : affect appropriate [de-identified] : Obese, protuberant. Soft, nontender, nondistended, no rebound or guarding.

## 2021-10-01 ENCOUNTER — OUTPATIENT (OUTPATIENT)
Dept: OUTPATIENT SERVICES | Facility: HOSPITAL | Age: 46
LOS: 1 days | End: 2021-10-01
Payer: COMMERCIAL

## 2021-10-01 ENCOUNTER — APPOINTMENT (OUTPATIENT)
Dept: SLEEP CENTER | Facility: CLINIC | Age: 46
End: 2021-10-01
Payer: COMMERCIAL

## 2021-10-01 DIAGNOSIS — N60.91 UNSPECIFIED BENIGN MAMMARY DYSPLASIA OF RIGHT BREAST: Chronic | ICD-10-CM

## 2021-10-01 DIAGNOSIS — Z98.89 OTHER SPECIFIED POSTPROCEDURAL STATES: Chronic | ICD-10-CM

## 2021-10-01 DIAGNOSIS — Z98.890 OTHER SPECIFIED POSTPROCEDURAL STATES: Chronic | ICD-10-CM

## 2021-10-01 DIAGNOSIS — Z98.84 BARIATRIC SURGERY STATUS: Chronic | ICD-10-CM

## 2021-10-01 PROCEDURE — 95806 SLEEP STUDY UNATT&RESP EFFT: CPT | Mod: 26

## 2021-10-01 PROCEDURE — 95806 SLEEP STUDY UNATT&RESP EFFT: CPT

## 2021-10-07 DIAGNOSIS — G47.33 OBSTRUCTIVE SLEEP APNEA (ADULT) (PEDIATRIC): ICD-10-CM

## 2021-10-20 ENCOUNTER — APPOINTMENT (OUTPATIENT)
Dept: BARIATRICS | Facility: CLINIC | Age: 46
End: 2021-10-20
Payer: COMMERCIAL

## 2021-10-20 ENCOUNTER — APPOINTMENT (OUTPATIENT)
Dept: SURGERY | Facility: CLINIC | Age: 46
End: 2021-10-20
Payer: COMMERCIAL

## 2021-10-20 VITALS
BODY MASS INDEX: 45.24 KG/M2 | DIASTOLIC BLOOD PRESSURE: 84 MMHG | WEIGHT: 265 LBS | OXYGEN SATURATION: 99 % | HEIGHT: 64 IN | HEART RATE: 88 BPM | SYSTOLIC BLOOD PRESSURE: 136 MMHG

## 2021-10-20 VITALS — TEMPERATURE: 97 F

## 2021-10-20 PROCEDURE — 99214 OFFICE O/P EST MOD 30 MIN: CPT

## 2021-10-20 PROCEDURE — XXXXX: CPT

## 2021-10-20 NOTE — ASSESSMENT
[FreeTextEntry1] : Patient remains an excellent candidate for weight loss surgery, given the presence/risk of developing or worsening of multiple obesity-related comorbidities, and remains committed to proceeding with weight loss surgery. \par \par Patient is in the process of obtaining the required pre-operative evaluations, which for this patient include:\par Pulmonary\par  \par Additional visit and clearance with nutritionist\par Lab work

## 2021-10-20 NOTE — PHYSICAL EXAM
[Obese, well nourished, in no acute distress] : obese, well nourished, in no acute distress [Normal] : affect appropriate [de-identified] : Obese, protuberant. Soft, nontender, nondistended, no rebound or guarding.

## 2021-10-20 NOTE — HISTORY OF PRESENT ILLNESS
[de-identified] : Patient is here for monthly pre-operative follow-up and weight management program in preparation for bariatric surgery. Patient is making good food choices, working on eating smaller portions and becoming more mindful.  Patient has made a concerted effort to eat more balanced and nutritionally sound meals, and to engage in some level of physical activity on a regular basis.  Patient continues to struggle with weight loss despite continuous concerted efforts since the prior visit.  Patient remains interested in a sleeve gastrectomy.  Patient reports no interval changes to overall health status or medical history and has no additional complaints at this time.she states that she followed up with her PMD about the blood work and was started on B12 injections.  Patient has completed the following evaluations: cardiology, GI, initial pulmonary. She also had her sleep study and is awaiting her CPAP.\par  [de-identified] : Patient reports no interval changes to medications, medical history or overall health status.\par

## 2021-10-20 NOTE — PLAN
Blood sugar remains mildly elevated at 108.  Recently he has been traveling in [place] and not following his diet very carefully.  He will return to a more healthy diet he reports.  
Gastroesophageal reflux is under good control primarily with diet and Zantac.  
He has had no recent angioedema from shellfish.  
His chronic low back pain is unchanged recently.  It only bothers him if he has been walking a long distance.  He treats this fairly well with Celebrex or Naprosyn.  
Most recent cholesterol is 204, triglycerides 181, HDL 32, .  He has been traveling a lot and not been following his diet very carefully.  He will get back onto a much healthier diet which we reviewed.  
Obstructive uropathy remains about the same.  He has nocturia x1.  
[FreeTextEntry1] :  Patient will obtain any outstanding evaluations in short order. Once again the pre-op requirements/evaluations and any available results were reviewed. \par \par We will collect and review any available/additional results and records of the multi-disciplinary evaluation. I encouraged patient to bring copies of all completed testing/evaluations to the next office visit with me. \par \par I encouraged the patient to continue a diet and exercise program to promote optimum health for the surgical procedure and post-op course.\par \par Patient voiced understanding of these behavioral recommendations and agrees to practice them with the understanding that success with these behaviors will be assessed at future visits. \par \par Patient’s questions and concerns addressed to patient's satisfaction and patient verbalized an understanding of the information discussed.\par

## 2021-10-21 LAB
ALBUMIN SERPL ELPH-MCNC: 4.9 G/DL
ALP BLD-CCNC: 100 U/L
ALT SERPL-CCNC: 23 U/L
ANION GAP SERPL CALC-SCNC: 22 MMOL/L
AST SERPL-CCNC: 21 U/L
BASOPHILS # BLD AUTO: 0.08 K/UL
BASOPHILS NFR BLD AUTO: 0.9 %
BILIRUB SERPL-MCNC: 0.3 MG/DL
BUN SERPL-MCNC: 10 MG/DL
CALCIUM SERPL-MCNC: 10.4 MG/DL
CALCIUM SERPL-MCNC: 10.4 MG/DL
CHLORIDE SERPL-SCNC: 101 MMOL/L
CHOLEST SERPL-MCNC: 242 MG/DL
CO2 SERPL-SCNC: 17 MMOL/L
CREAT SERPL-MCNC: 0.83 MG/DL
EOSINOPHIL # BLD AUTO: 0.04 K/UL
EOSINOPHIL NFR BLD AUTO: 0.5 %
ESTIMATED AVERAGE GLUCOSE: 137 MG/DL
FOLATE SERPL-MCNC: 9.9 NG/ML
GLUCOSE SERPL-MCNC: 99 MG/DL
HBA1C MFR BLD HPLC: 6.4 %
HCT VFR BLD CALC: 45.8 %
HDLC SERPL-MCNC: 62 MG/DL
HGB BLD-MCNC: 14.2 G/DL
IMM GRANULOCYTES NFR BLD AUTO: 0.2 %
IRON SERPL-MCNC: 70 UG/DL
LDLC SERPL CALC-MCNC: 162 MG/DL
LYMPHOCYTES # BLD AUTO: 4.36 K/UL
LYMPHOCYTES NFR BLD AUTO: 49.3 %
MAGNESIUM SERPL-MCNC: 2.1 MG/DL
MAN DIFF?: NORMAL
MCHC RBC-ENTMCNC: 28.7 PG
MCHC RBC-ENTMCNC: 31 GM/DL
MCV RBC AUTO: 92.5 FL
MONOCYTES # BLD AUTO: 0.59 K/UL
MONOCYTES NFR BLD AUTO: 6.7 %
NEUTROPHILS # BLD AUTO: 3.75 K/UL
NEUTROPHILS NFR BLD AUTO: 42.4 %
NONHDLC SERPL-MCNC: 179 MG/DL
PARATHYROID HORMONE INTACT: 44 PG/ML
PLATELET # BLD AUTO: 450 K/UL
POTASSIUM SERPL-SCNC: 5 MMOL/L
PROT SERPL-MCNC: 8.4 G/DL
RBC # BLD: 4.95 M/UL
RBC # FLD: 14.5 %
SODIUM SERPL-SCNC: 140 MMOL/L
TRIGL SERPL-MCNC: 85 MG/DL
TSH SERPL-ACNC: 0.57 UIU/ML
VIT B12 SERPL-MCNC: 1485 PG/ML
WBC # FLD AUTO: 8.84 K/UL

## 2021-10-27 LAB — 24R-OH-CALCIDIOL SERPL-MCNC: 46.9 PG/ML

## 2021-10-27 NOTE — CONSULT LETTER
[Dear  ___] : Dear  [unfilled], [Consult Letter:] : I had the pleasure of evaluating your patient, [unfilled]. [Please see my note below.] : Please see my note below. [Consult Closing:] : Thank you very much for allowing me to participate in the care of this patient.  If you have any questions, please do not hesitate to contact me. [Sincerely,] : Sincerely, [FreeTextEntry3] : Haydee Joy [facility], [facility]

## 2021-10-27 NOTE — PHYSICAL EXAM
[No Acute Distress] : no acute distress [No Neck Mass] : no neck mass [No JVD] : no jvd [Normal Rate/Rhythm] : normal rate/rhythm [No Resp Distress] : no resp distress [No Acc Muscle Use] : no acc muscle use [No Abnormalities] : no abnormalities [Benign] : benign [Normal Gait] : normal gait [No Cyanosis] : no cyanosis [No Petechiae] : no petechiae [No Edema] : no edema [Normal Color/ Pigmentation] : normal color/ pigmentation [No Focal Deficits] : no focal deficits [Oriented x3] : oriented x3 [Normal Mood] : normal mood [Normal Oropharynx] : normal oropharynx [TextBox_2] : Obese female, sitting upright, not in acute distress

## 2021-10-27 NOTE — HISTORY OF PRESENT ILLNESS
[Never] : never [Obstructive Sleep Apnea] : obstructive sleep apnea [Snoring] : snoring [TextBox_4] : Patient is a 44 y/o F with medical hx significant for ABRAM (not compliant of CPAP) and surgical hx significant for Lap band surgery, Lumbar spinal stenosis s/p discectomies in the past, coming to the clinic for Pre-op stratification for Bariatric surgery. Pt endorses excessive sleep during the daytime, along with witnessed snoring during the night-time. Exercise capacitance ~8 steps/1 block prior to when she becomes short of breath. Denies hx of COVID, pt has been vaccinated. No hx of chest pain, fevers, palpitations, leg swelling/pain. \par Recent Echo showing normal EF, unknown if there was evidence of pulm HTN noted.

## 2021-11-01 ENCOUNTER — APPOINTMENT (OUTPATIENT)
Dept: PULMONOLOGY | Facility: CLINIC | Age: 46
End: 2021-11-01

## 2021-11-17 ENCOUNTER — APPOINTMENT (OUTPATIENT)
Dept: BARIATRICS | Facility: CLINIC | Age: 46
End: 2021-11-17

## 2021-11-17 ENCOUNTER — APPOINTMENT (OUTPATIENT)
Dept: SURGERY | Facility: CLINIC | Age: 46
End: 2021-11-17
Payer: COMMERCIAL

## 2021-11-17 VITALS
HEIGHT: 64 IN | HEART RATE: 77 BPM | WEIGHT: 267 LBS | SYSTOLIC BLOOD PRESSURE: 119 MMHG | BODY MASS INDEX: 45.58 KG/M2 | DIASTOLIC BLOOD PRESSURE: 82 MMHG

## 2021-11-17 VITALS — TEMPERATURE: 97.5 F

## 2021-11-17 PROCEDURE — 99214 OFFICE O/P EST MOD 30 MIN: CPT

## 2021-11-17 RX ORDER — OMEPRAZOLE, SODIUM BICARBONATE 40; 1100 MG/1; MG/1
40-1100 CAPSULE ORAL
Qty: 30 | Refills: 0 | Status: DISCONTINUED | COMMUNITY
Start: 2018-06-12 | End: 2021-11-17

## 2021-11-17 NOTE — ASSESSMENT
[FreeTextEntry1] : Patient with BMI of 42 which places patient in the morbidly obese category. This has directly contributed to patient's current medical conditions as well as, a decreased quality of life. Patient has very little chance of successfully losing and maintaining a significant amount of weight with non-surgical management, and would benefit from surgical intervention. Patient meets the criteria for weight loss surgery as defined in the NIH consensus statement, surgery is medically necessary. \par Given patient’s current BMI and obesity-related comorbidities, I feel the patient remains a candidate for and would benefit from weight loss surgery, as all prior attempts by non-surgical means have been futile.  Patient has put a great deal of thought and consideration into weight loss surgery, and remains  committed to proceeding with weight loss surgery.\par \par

## 2021-11-17 NOTE — HISTORY OF PRESENT ILLNESS
[de-identified] : Patient is here for review and discussion of informed consent in preparation for planned sleeve gastrectomy. \par Patient has attended our weight loss seminar, and has completed all of the required pre-operative testing and evaluations, and is ready to proceed with a sleeve gastrectomy. \par \par Patient is aware of the pre-op liquid diet for 2 weeks and has attending the pre-op class and has completed the pre-testing and follow with a visit to PCP for medical clearance. \par \par Patient has a long-standing history of severe obesity refractory to multiple prior attempts at weight loss including conservative treatments of diet and exercise. In addition, patient suffers from obesity-related co-morbidities.  \par \par Patient reports no interval changes to overall health status or medical history, and has no complaints at this time. \par \par

## 2021-11-17 NOTE — PHYSICAL EXAM
[Obese, well nourished, in no acute distress] : obese, well nourished, in no acute distress [Normal] : affect appropriate [de-identified] : Obese, protuberant. Soft, nontender, nondistended, no rebound or guarding.

## 2021-11-17 NOTE — DATA REVIEWED
[FreeTextEntry1] : Pulmonary - no CPAP needed\par Cardiology - testing reviewed, patient optimized\par GI - no hiatal hernia, H. pylori not found\par Nutrition - final clearance today

## 2021-11-30 ENCOUNTER — NON-APPOINTMENT (OUTPATIENT)
Age: 46
End: 2021-11-30

## 2021-11-30 ENCOUNTER — APPOINTMENT (OUTPATIENT)
Dept: ENDOCRINOLOGY | Facility: CLINIC | Age: 46
End: 2021-11-30
Payer: COMMERCIAL

## 2021-11-30 VITALS
TEMPERATURE: 96.8 F | DIASTOLIC BLOOD PRESSURE: 86 MMHG | BODY MASS INDEX: 45.24 KG/M2 | SYSTOLIC BLOOD PRESSURE: 135 MMHG | WEIGHT: 265 LBS | HEART RATE: 81 BPM | HEIGHT: 64 IN | OXYGEN SATURATION: 98 %

## 2021-11-30 DIAGNOSIS — E83.52 HYPERCALCEMIA: ICD-10-CM

## 2021-11-30 DIAGNOSIS — E05.90 THYROTOXICOSIS, UNSPECIFIED W/OUT THYROTOXIC CRISIS OR STORM: ICD-10-CM

## 2021-11-30 PROCEDURE — 99212 OFFICE O/P EST SF 10 MIN: CPT | Mod: 25

## 2021-11-30 PROCEDURE — 36415 COLL VENOUS BLD VENIPUNCTURE: CPT

## 2021-12-02 PROBLEM — E05.90 SUBCLINICAL HYPERTHYROIDISM: Status: ACTIVE | Noted: 2021-12-02

## 2021-12-02 LAB
ALBUMIN SERPL ELPH-MCNC: 4.9 G/DL
ALP BLD-CCNC: 90 U/L
ALT SERPL-CCNC: 18 U/L
ANION GAP SERPL CALC-SCNC: 15 MMOL/L
AST SERPL-CCNC: 16 U/L
BILIRUB SERPL-MCNC: 0.3 MG/DL
BUN SERPL-MCNC: 13 MG/DL
CALCIUM SERPL-MCNC: 10.4 MG/DL
CHLORIDE SERPL-SCNC: 99 MMOL/L
CO2 SERPL-SCNC: 24 MMOL/L
CREAT SERPL-MCNC: 0.74 MG/DL
GLUCOSE SERPL-MCNC: 100 MG/DL
POTASSIUM SERPL-SCNC: 5.1 MMOL/L
PROT SERPL-MCNC: 8 G/DL
SODIUM SERPL-SCNC: 138 MMOL/L
T3 SERPL-MCNC: 116 NG/DL
T4 FREE SERPL-MCNC: 1 NG/DL
TSH SERPL-ACNC: 1.27 UIU/ML

## 2021-12-02 RX ORDER — METRONIDAZOLE 500 MG/1
500 TABLET ORAL
Qty: 14 | Refills: 0 | Status: ACTIVE | COMMUNITY
Start: 2021-11-20

## 2021-12-02 RX ORDER — FOLIC ACID 1 MG/1
1 TABLET ORAL
Qty: 90 | Refills: 0 | Status: ACTIVE | COMMUNITY
Start: 2021-11-13

## 2021-12-02 NOTE — HISTORY OF PRESENT ILLNESS
[FreeTextEntry1] : CC: Thyroid problem\par This is a 46-year-old female with ABRAM, vitamin B12 deficiency, prediabetes, folate deficiency, spinal stenosis, fatty liver, here for evaluation of possible thyroid disorder.\par TSH was found to be 0.01 in August 2021 after she had blood work for possible sleeve gastrectomy.  Free T4 was normal.  She denies a prior history of thyroid disease.  There is no family history of thyroid disease.  She denies a preceding infection.  Denies symptoms of hyperthyroidism.  Repeat TSH was normal.\par Calcium was also found to be elevated during that time.\par

## 2021-12-02 NOTE — ASSESSMENT
[FreeTextEntry1] : This is a 46-year-old female with ABRAM, vitamin B12 deficiency, prediabetes, folate deficiency, spinal stenosis, fatty liver, here for evaluation of possible thyroid disorder.\par TSH was found to be 0.01 in August 2021 after she had blood work for possible sleeve gastrectomy.  Free T4 was normal.  She denies a prior history of thyroid disease.  There is no family history of thyroid disease.  She denies a preceding infection.  Denies symptoms of hyperthyroidism.  Repeat TSH was normal.\par Check repeat TFTs.\par She is clinically euthyroid.\par Check thyroid antibodies to evaluate for autoimmune thyroid disease.\par Further management pending above results.\par

## 2021-12-02 NOTE — PHYSICAL EXAM
[Alert] : alert [Well Nourished] : well nourished [Healthy Appearance] : healthy appearance [Obese] : obese [No Acute Distress] : no acute distress [Well Developed] : well developed [Normal Voice/Communication] : normal voice communication [Normal Sclera/Conjunctiva] : normal sclera/conjunctiva [No Proptosis] : no proptosis [No Neck Mass] : no neck mass was observed [No LAD] : no lymphadenopathy [Supple] : the neck was supple [No Thyroid Nodules] : no palpable thyroid nodules [No Respiratory Distress] : no respiratory distress [Normal Rate] : heart rate was normal [Normal Affect] : the affect was normal [Normal Insight/Judgement] : insight and judgment were intact [Normal Mood] : the mood was normal

## 2021-12-03 ENCOUNTER — NON-APPOINTMENT (OUTPATIENT)
Age: 46
End: 2021-12-03

## 2021-12-03 LAB — TSI ACT/NOR SER: <0.1 IU/L

## 2022-01-21 ENCOUNTER — APPOINTMENT (OUTPATIENT)
Dept: DISASTER EMERGENCY | Facility: CLINIC | Age: 47
End: 2022-01-21

## 2022-02-06 ENCOUNTER — EMERGENCY (EMERGENCY)
Facility: HOSPITAL | Age: 47
LOS: 1 days | Discharge: HOME | End: 2022-02-06
Attending: EMERGENCY MEDICINE
Payer: SELF-PAY

## 2022-02-06 VITALS
OXYGEN SATURATION: 98 % | TEMPERATURE: 98 F | DIASTOLIC BLOOD PRESSURE: 82 MMHG | HEIGHT: 64 IN | SYSTOLIC BLOOD PRESSURE: 124 MMHG | WEIGHT: 278 LBS | HEART RATE: 88 BPM | RESPIRATION RATE: 19 BRPM

## 2022-02-06 DIAGNOSIS — Z98.890 OTHER SPECIFIED POSTPROCEDURAL STATES: Chronic | ICD-10-CM

## 2022-02-06 DIAGNOSIS — N60.91 UNSPECIFIED BENIGN MAMMARY DYSPLASIA OF RIGHT BREAST: Chronic | ICD-10-CM

## 2022-02-06 DIAGNOSIS — Z98.89 OTHER SPECIFIED POSTPROCEDURAL STATES: Chronic | ICD-10-CM

## 2022-02-06 DIAGNOSIS — Z98.84 BARIATRIC SURGERY STATUS: Chronic | ICD-10-CM

## 2022-02-06 PROCEDURE — 73562 X-RAY EXAM OF KNEE 3: CPT | Mod: 26,LT

## 2022-02-06 PROCEDURE — 99283 EMERGENCY DEPT VISIT LOW MDM: CPT

## 2022-02-06 PROCEDURE — 73562 X-RAY EXAM OF KNEE 3: CPT

## 2022-02-06 PROCEDURE — 99283 EMERGENCY DEPT VISIT LOW MDM: CPT | Mod: 25

## 2022-02-06 RX ORDER — IBUPROFEN 200 MG
600 TABLET ORAL ONCE
Refills: 0 | Status: COMPLETED | OUTPATIENT
Start: 2022-02-06 | End: 2022-02-06

## 2022-02-06 RX ADMIN — Medication 600 MILLIGRAM(S): at 16:33

## 2022-02-06 RX ADMIN — Medication 600 MILLIGRAM(S): at 16:03

## 2022-02-06 NOTE — ED PROVIDER NOTE - NSICDXPASTMEDICALHX_GEN_ALL_CORE_FT
PAST MEDICAL HISTORY:  GERD (gastroesophageal reflux disease)     Herniation of intervertebral disc of lumbar region     Hiatal hernia     Obstructive sleep apnea on CPAP     Osteoarthritis     Ovarian cyst     Tendonitis of left hip

## 2022-02-06 NOTE — ED PROVIDER NOTE - OBJECTIVE STATEMENT
46 year old female with no significant past medical history, and past surgical history of  section, left knee ACL repair, left knee meniscus repair, and lumbar discectomy presents to the ED with complaints of left knee pain for 2 days. The patient states that she was at work running on Friday, and afterwards noticed pain in her left knee. She additionally states that the pain continued so she came in to be evaluated, but denies taking any medications for pain. The patient denies any falls or injury to the area.  NKDA.

## 2022-02-06 NOTE — ED PROVIDER NOTE - MUSCULOSKELETAL, MLM
Mild tenderness to palpation to the medial left knee. 5/5 strength on flexion and extension. Ambulatory with steady gait.

## 2022-02-06 NOTE — ED PROVIDER NOTE - PROGRESS NOTE DETAILS
Xr neg.  Patient nontoxic and medically stable for discharge. Results discussed with patient. Return precautions provided and patient understands to return to the ED for concerning or worsening signs and symptoms. Instructed to follow up with ortho if needed and agreeable. Patient's questions answered.  ACE wrap placed on patient.

## 2022-02-06 NOTE — ED ADULT NURSE NOTE - BREATHING, MLM
Lab slip Received: Today Message Contents   Kimberlee Márquez R.N.      I just scheduled Dr. Kamara's patient for 7/22/2020 at [facility] and she was wondering if you could mail her a lab slip.  I did not see one in chart.  Thanks, Kimberlee GARCIA and Lipid ordered.  Lab slip printed and mailed to pt mailing address.   Spontaneous, unlabored and symmetrical

## 2022-02-06 NOTE — ED PROVIDER NOTE - NSICDXPASTSURGICALHX_GEN_ALL_CORE_FT
PAST SURGICAL HISTORY:  Benign cyst of right breast     History of adjustable gastric banding     History of arthroscopy of both knees     History of      S/P D&C (status post dilation and curettage)       S/P ACL repair     S/P lumbar discectomy     Status post medial meniscus repair

## 2022-02-06 NOTE — ED PROVIDER NOTE - ATTENDING CONTRIBUTION TO CARE
46 year old female  presents to the ED with complaints of left knee pain. Will obtain x-ray, provide the patient with Ibuprofen, and advise the patient to follow up with Orthopedic doctor.

## 2022-03-28 ENCOUNTER — EMERGENCY (EMERGENCY)
Facility: HOSPITAL | Age: 47
LOS: 1 days | Discharge: HOME | End: 2022-03-28
Attending: EMERGENCY MEDICINE
Payer: COMMERCIAL

## 2022-03-28 VITALS
DIASTOLIC BLOOD PRESSURE: 83 MMHG | WEIGHT: 266.1 LBS | TEMPERATURE: 98 F | HEIGHT: 64 IN | HEART RATE: 97 BPM | SYSTOLIC BLOOD PRESSURE: 143 MMHG | OXYGEN SATURATION: 99 % | RESPIRATION RATE: 17 BRPM

## 2022-03-28 DIAGNOSIS — N60.91 UNSPECIFIED BENIGN MAMMARY DYSPLASIA OF RIGHT BREAST: Chronic | ICD-10-CM

## 2022-03-28 DIAGNOSIS — Z98.84 BARIATRIC SURGERY STATUS: Chronic | ICD-10-CM

## 2022-03-28 DIAGNOSIS — Z98.89 OTHER SPECIFIED POSTPROCEDURAL STATES: Chronic | ICD-10-CM

## 2022-03-28 DIAGNOSIS — Z98.890 OTHER SPECIFIED POSTPROCEDURAL STATES: Chronic | ICD-10-CM

## 2022-03-28 PROCEDURE — 73660 X-RAY EXAM OF TOE(S): CPT

## 2022-03-28 PROCEDURE — 28490 TREAT BIG TOE FRACTURE: CPT | Mod: TA

## 2022-03-28 PROCEDURE — 73120 X-RAY EXAM OF HAND: CPT

## 2022-03-28 PROCEDURE — 73630 X-RAY EXAM OF FOOT: CPT

## 2022-03-28 PROCEDURE — 99284 EMERGENCY DEPT VISIT MOD MDM: CPT | Mod: 57

## 2022-03-28 PROCEDURE — 73130 X-RAY EXAM OF HAND: CPT

## 2022-03-28 PROCEDURE — 99285 EMERGENCY DEPT VISIT HI MDM: CPT | Mod: 25

## 2022-03-28 PROCEDURE — 73630 X-RAY EXAM OF FOOT: CPT | Mod: 26,LT

## 2022-03-28 PROCEDURE — 28660 TREAT TOE DISLOCATION: CPT | Mod: 54,TA

## 2022-03-28 PROCEDURE — 73130 X-RAY EXAM OF HAND: CPT | Mod: 26,LT

## 2022-03-28 RX ORDER — IBUPROFEN 200 MG
800 TABLET ORAL ONCE
Refills: 0 | Status: COMPLETED | OUTPATIENT
Start: 2022-03-28 | End: 2022-03-28

## 2022-03-28 RX ORDER — ACETAMINOPHEN 500 MG
975 TABLET ORAL ONCE
Refills: 0 | Status: COMPLETED | OUTPATIENT
Start: 2022-03-28 | End: 2022-03-28

## 2022-03-28 RX ADMIN — Medication 975 MILLIGRAM(S): at 19:06

## 2022-03-28 RX ADMIN — Medication 975 MILLIGRAM(S): at 19:36

## 2022-03-28 RX ADMIN — Medication 800 MILLIGRAM(S): at 23:09

## 2022-03-28 NOTE — ED PROVIDER NOTE - DR. NAME
This is an 81 YO W Female brought to ER because patient found unresponsive by her grandson.  Pt awake, alert, confused, cognitive-linguistic deficits 2 hx of advances dementia, seen c patient's daughter bedside who reports pt on regular diet PTA  Pt c adequate strippage of bolus, slow mastication c oral holding, delayed a-p lingual transport of chopped/soft trials c multiple swallows to clear puree bolus increasing choking risk  Clear breath sounds pre/post deglutition Lesia Kumar (Attending)

## 2022-03-28 NOTE — ED PROVIDER NOTE - MUSCULOSKELETAL, MLM
Ecchymosis and tenderness to toes one, two, and three of the left foot. No foot tenderness to palpation. No ankle tenderness to palpation. Mild tenderness to palpation to the left hand over the second, third, and fourth metacarpals. No tony tenderness to palpation to the right hand.

## 2022-03-28 NOTE — ED PROVIDER NOTE - NSICDXPASTSURGICALHX_GEN_ALL_CORE_FT
PAST SURGICAL HISTORY:  Benign cyst of right breast     History of adjustable gastric banding     History of arthroscopy of both knees     History of      S/P ACL repair     S/P D&C (status post dilation and curettage)     S/P lumbar discectomy     Status post medial meniscus repair

## 2022-03-28 NOTE — ED ADULT TRIAGE NOTE - CHIEF COMPLAINT QUOTE
left foot and toe pain s/p tripped and fell x today. Denies LOC. Pt went to [facility] and recommends to ED for x-ray. Awake/Alert

## 2022-03-28 NOTE — ED PROVIDER NOTE - PATIENT PORTAL LINK FT
You can access the FollowMyHealth Patient Portal offered by [facility] by registering at the following website: [email or web address]. By joining [facility]’s FollowMyHealth portal, you will also be able to view your health information using other applications (apps) compatible with our system.

## 2022-03-28 NOTE — ED ADULT NURSE NOTE - CHIEF COMPLAINT QUOTE
left foot and toe pain s/p tripped and fell x today. Denies LOC. Pt went to [facility] and recommends to ED for x-ray.

## 2022-03-28 NOTE — ED PROVIDER NOTE - NSFOLLOWUPINSTRUCTIONS_ED_ALL_ED_FT
Followup with podiatry this week     Toe Fracture    WHAT YOU NEED TO KNOW:    A toe fracture is a break in a bone in your toe.   Foot Anatomy         DISCHARGE INSTRUCTIONS:    Seek care immediately if:   •Blood soaks through your bandage.      •You have severe pain in your toe.      •Your toe is cold or numb.      Call your doctor if:   •You have a fever.      •Your pain does not go away, even after treatment.      •Your toe continues to hurt even after it has healed.      •You have questions or concerns about your condition or care.      Medicines: You may need any of the following:   •NSAIDs, such as ibuprofen, help decrease swelling, pain, and fever. This medicine is available with or without a doctor's order. NSAIDs can cause stomach bleeding or kidney problems in certain people. If you take blood thinner medicine, always ask your healthcare provider if NSAIDs are safe for you. Always read the medicine label and follow directions.      •Prescription pain medicine may be given. Ask your healthcare provider how to take this medicine safely. Some prescription pain medicines contain acetaminophen. Do not take other medicines that contain acetaminophen without talking to your healthcare provider. Too much acetaminophen may cause liver damage. Prescription pain medicine may cause constipation. Ask your healthcare provider how to prevent or treat constipation.       •Antibiotics treat a bacterial infection. You may need antibiotics if you have an open wound.      •Take your medicine as directed. Contact your healthcare provider if you think your medicine is not helping or if you have side effects. Tell him of her if you are allergic to any medicine. Keep a list of the medicines, vitamins, and herbs you take. Include the amounts, and when and why you take them. Bring the list or the pill bottles to follow-up visits. Carry your medicine list with you in case of an emergency.      Self-care:   •Rest your toe so that it can heal. Return to normal activities as directed.      •Apply ice on your toe for 15 to 20 minutes every hour or as directed. Use an ice pack, or put crushed ice in a plastic bag. Cover it with a towel before you put it on your toe. Ice helps prevent tissue damage and decreases swelling and pain.      •Elevate your toe above the level of your heart as often as you can. This will help decrease swelling and pain. Prop your toe on pillows or blankets to keep it elevated comfortably.  Elevate Leg           •Use bright tape, an elastic bandage, or a splint as directed. These help keep your toe in its correct position as it heals. Bright tape means your fractured toe and the toe next to it are taped together.      •Use a support device such as a cane, crutches, walking boot, or hard soled shoe as directed. These help protect your toe and limit movement so it can heal.  Walking Boot           Follow up with your doctor as directed: You may need to return in 2 to 4 weeks. Write down your questions so you remember to ask them during your visits.

## 2022-03-28 NOTE — ED PROVIDER NOTE - OBJECTIVE STATEMENT
46 year old female with no pertinent PMHx presents to the ED S/P a trip and fall today. Patient states that she tripped while chasing her dog in a pet store, landing onto her hands and twisting her left foot at the time. Patient is now complaining of pain to toes one, two, and three of her left foot and mild pain to her left hand.  Patient reports that she initially visited an Urgent Care to seek evaluation, however was then referred to the ED to undergo X-rays due to the Urgent Care not having a working X-ray. Patient denies all other acute complaints at this time. NKDA.

## 2022-03-28 NOTE — ED PROVIDER NOTE - SKIN, MLM
Small abrasion to the dorsal aspect of the left hand. Small abrasion to the thenar eminence of the right hand.

## 2022-03-28 NOTE — ED PROVIDER NOTE - CLINICAL SUMMARY MEDICAL DECISION MAKING FREE TEXT BOX
Patient presenting S/P a trip and fall with complaints of foot pain and hand pain. Patient received a Toradol at Urgent Care prior to arrival, so will perform X-rays, give Tylenol, and reassess.

## 2022-03-31 ENCOUNTER — OUTPATIENT (OUTPATIENT)
Dept: OUTPATIENT SERVICES | Facility: HOSPITAL | Age: 47
LOS: 1 days | End: 2022-03-31
Payer: COMMERCIAL

## 2022-03-31 VITALS
DIASTOLIC BLOOD PRESSURE: 81 MMHG | HEIGHT: 64.5 IN | SYSTOLIC BLOOD PRESSURE: 144 MMHG | WEIGHT: 268.08 LBS | TEMPERATURE: 97 F | OXYGEN SATURATION: 100 % | HEART RATE: 85 BPM | RESPIRATION RATE: 18 BRPM

## 2022-03-31 DIAGNOSIS — Z98.890 OTHER SPECIFIED POSTPROCEDURAL STATES: Chronic | ICD-10-CM

## 2022-03-31 DIAGNOSIS — Z91.89 OTHER SPECIFIED PERSONAL RISK FACTORS, NOT ELSEWHERE CLASSIFIED: ICD-10-CM

## 2022-03-31 DIAGNOSIS — E66.01 MORBID (SEVERE) OBESITY DUE TO EXCESS CALORIES: ICD-10-CM

## 2022-03-31 DIAGNOSIS — N60.91 UNSPECIFIED BENIGN MAMMARY DYSPLASIA OF RIGHT BREAST: Chronic | ICD-10-CM

## 2022-03-31 DIAGNOSIS — Z98.89 OTHER SPECIFIED POSTPROCEDURAL STATES: Chronic | ICD-10-CM

## 2022-03-31 DIAGNOSIS — Z01.818 ENCOUNTER FOR OTHER PREPROCEDURAL EXAMINATION: ICD-10-CM

## 2022-03-31 DIAGNOSIS — Z98.84 BARIATRIC SURGERY STATUS: Chronic | ICD-10-CM

## 2022-03-31 LAB — BLD GP AB SCN SERPL QL: SIGNIFICANT CHANGE UP

## 2022-03-31 PROCEDURE — G0463: CPT

## 2022-03-31 RX ORDER — GABAPENTIN 400 MG/1
300 CAPSULE ORAL ONCE
Refills: 0 | Status: DISCONTINUED | OUTPATIENT
Start: 2022-03-31 | End: 2022-04-04

## 2022-03-31 NOTE — H&P PST ADULT - NEGATIVE OPHTHALMOLOGIC SYMPTOMS
Glasses for drive/no blurred vision L/no blurred vision R/no pain R/no loss of vision L/no loss of vision R

## 2022-03-31 NOTE — H&P PST ADULT - MUSCULOSKELETAL
Wearing ortho boots for ankle pain/decreased ROM due to pain/diminished strength details… detailed exam

## 2022-03-31 NOTE — H&P PST ADULT - MS GEN HX ROS MEA POS PC
Left ankle fracture sustained 3/28/2022 secondary to tripping while running after dog/arthritis/joint pain/muscle weakness/stiffness/leg pain L

## 2022-03-31 NOTE — H&P PST ADULT - HISTORY OF PRESENT ILLNESS
46year old morbidly obese female with pmhx of GERD, Ovarian cyst, osteoarthritis, ABRAM (recent study done 10/1/21 neg), hiatal hernia, herniated lumbar disc and tendinitis of hip presents with c/o failure to loose weight despite trying several dietary modalities, gastric banding (2008, eventually removed in 2019), calorie reduction, portion control and exercise. Patient is here today for presurgical testing for scheduled robotic assisted laparoscopic sleeve gastrectomy on 4/4/2022

## 2022-03-31 NOTE — H&P PST ADULT - PROBLEM SELECTOR PLAN 2
Patient today with STOP bang score of 3, Intermediate risk for ABRAM   Patient with known hx of ABRAM and negative sleep study test done 10/2021  Recommend maintaining perioperative ABRAM risk precautions.

## 2022-03-31 NOTE — H&P PST ADULT - PROBLEM SELECTOR PLAN 1
Patient scheduled for robotic assisted laparoscopic sleeve gastrectomy on 4/4/2022  Written and oral preoperative instructions given to patient with understanding verbalized.   Instructions given to include using 4% chlorhexidine wash as directed starting 3days before day of surgery (inclusive of day of surgery)  Maintaining NPO status post midnight day before surgery  Stopping aspirin, NSAIDs, herbs, vitamins 7days before surgery   Patient is to expect a phone call day before surgery between the hours of 430- 630pm giving arrival time for surgery day.    Type/scree drawn today, results pending

## 2022-04-03 ENCOUNTER — TRANSCRIPTION ENCOUNTER (OUTPATIENT)
Age: 47
End: 2022-04-03

## 2022-04-04 ENCOUNTER — APPOINTMENT (OUTPATIENT)
Dept: SURGERY | Facility: HOSPITAL | Age: 47
End: 2022-04-04

## 2022-04-04 ENCOUNTER — TRANSCRIPTION ENCOUNTER (OUTPATIENT)
Age: 47
End: 2022-04-04

## 2022-04-04 ENCOUNTER — INPATIENT (INPATIENT)
Facility: HOSPITAL | Age: 47
LOS: 0 days | Discharge: HOME | DRG: 621 | End: 2022-04-05
Attending: SURGERY | Admitting: SURGERY
Payer: COMMERCIAL

## 2022-04-04 VITALS
HEART RATE: 91 BPM | OXYGEN SATURATION: 100 % | RESPIRATION RATE: 16 BRPM | HEIGHT: 64.5 IN | DIASTOLIC BLOOD PRESSURE: 81 MMHG | TEMPERATURE: 98 F | WEIGHT: 268.08 LBS | SYSTOLIC BLOOD PRESSURE: 151 MMHG

## 2022-04-04 DIAGNOSIS — Z98.890 OTHER SPECIFIED POSTPROCEDURAL STATES: Chronic | ICD-10-CM

## 2022-04-04 DIAGNOSIS — Z98.84 BARIATRIC SURGERY STATUS: Chronic | ICD-10-CM

## 2022-04-04 DIAGNOSIS — R07.9 CHEST PAIN, UNSPECIFIED: ICD-10-CM

## 2022-04-04 DIAGNOSIS — N60.91 UNSPECIFIED BENIGN MAMMARY DYSPLASIA OF RIGHT BREAST: Chronic | ICD-10-CM

## 2022-04-04 DIAGNOSIS — Z98.89 OTHER SPECIFIED POSTPROCEDURAL STATES: Chronic | ICD-10-CM

## 2022-04-04 DIAGNOSIS — E66.01 MORBID (SEVERE) OBESITY DUE TO EXCESS CALORIES: ICD-10-CM

## 2022-04-04 LAB
ALBUMIN SERPL ELPH-MCNC: 3.7 G/DL — SIGNIFICANT CHANGE UP (ref 3.5–5)
ALP SERPL-CCNC: 77 U/L — SIGNIFICANT CHANGE UP (ref 40–120)
ALT FLD-CCNC: 138 U/L DA — HIGH (ref 10–60)
ANION GAP SERPL CALC-SCNC: 8 MMOL/L — SIGNIFICANT CHANGE UP (ref 5–17)
ANION GAP SERPL CALC-SCNC: 9 MMOL/L — SIGNIFICANT CHANGE UP (ref 5–17)
AST SERPL-CCNC: 122 U/L — HIGH (ref 10–40)
BASOPHILS # BLD AUTO: 0.06 K/UL — SIGNIFICANT CHANGE UP (ref 0–0.2)
BASOPHILS NFR BLD AUTO: 0.3 % — SIGNIFICANT CHANGE UP (ref 0–2)
BILIRUB SERPL-MCNC: 0.3 MG/DL — SIGNIFICANT CHANGE UP (ref 0.2–1.2)
BLD GP AB SCN SERPL QL: SIGNIFICANT CHANGE UP
BUN SERPL-MCNC: 5 MG/DL — LOW (ref 7–18)
BUN SERPL-MCNC: 6 MG/DL — LOW (ref 7–18)
CALCIUM SERPL-MCNC: 8.9 MG/DL — SIGNIFICANT CHANGE UP (ref 8.4–10.5)
CALCIUM SERPL-MCNC: 9 MG/DL — SIGNIFICANT CHANGE UP (ref 8.4–10.5)
CHLORIDE SERPL-SCNC: 103 MMOL/L — SIGNIFICANT CHANGE UP (ref 96–108)
CHLORIDE SERPL-SCNC: 106 MMOL/L — SIGNIFICANT CHANGE UP (ref 96–108)
CK MB BLD-MCNC: 1.2 % — SIGNIFICANT CHANGE UP (ref 0–3.5)
CK MB BLD-MCNC: 1.4 % — SIGNIFICANT CHANGE UP (ref 0–3.5)
CK MB CFR SERPL CALC: 3.8 NG/ML — HIGH (ref 0–3.6)
CK MB CFR SERPL CALC: 5.4 NG/ML — HIGH (ref 0–3.6)
CK SERPL-CCNC: 312 U/L — HIGH (ref 21–215)
CK SERPL-CCNC: 400 U/L — HIGH (ref 21–215)
CO2 SERPL-SCNC: 20 MMOL/L — LOW (ref 22–31)
CO2 SERPL-SCNC: 25 MMOL/L — SIGNIFICANT CHANGE UP (ref 22–31)
CREAT SERPL-MCNC: 0.76 MG/DL — SIGNIFICANT CHANGE UP (ref 0.5–1.3)
CREAT SERPL-MCNC: 0.87 MG/DL — SIGNIFICANT CHANGE UP (ref 0.5–1.3)
EGFR: 82 ML/MIN/1.73M2 — SIGNIFICANT CHANGE UP
EGFR: 97 ML/MIN/1.73M2 — SIGNIFICANT CHANGE UP
EOSINOPHIL # BLD AUTO: 0.05 K/UL — SIGNIFICANT CHANGE UP (ref 0–0.5)
EOSINOPHIL NFR BLD AUTO: 0.3 % — SIGNIFICANT CHANGE UP (ref 0–6)
GLUCOSE SERPL-MCNC: 148 MG/DL — HIGH (ref 70–99)
GLUCOSE SERPL-MCNC: 160 MG/DL — HIGH (ref 70–99)
HCG UR QL: NEGATIVE — SIGNIFICANT CHANGE UP
HCT VFR BLD CALC: 38.4 % — SIGNIFICANT CHANGE UP (ref 34.5–45)
HCT VFR BLD CALC: 44.1 % — SIGNIFICANT CHANGE UP (ref 34.5–45)
HGB BLD-MCNC: 12.6 G/DL — SIGNIFICANT CHANGE UP (ref 11.5–15.5)
HGB BLD-MCNC: 13.6 G/DL — SIGNIFICANT CHANGE UP (ref 11.5–15.5)
IMM GRANULOCYTES NFR BLD AUTO: 0.3 % — SIGNIFICANT CHANGE UP (ref 0–1.5)
LYMPHOCYTES # BLD AUTO: 1.76 K/UL — SIGNIFICANT CHANGE UP (ref 1–3.3)
LYMPHOCYTES # BLD AUTO: 10 % — LOW (ref 13–44)
MCHC RBC-ENTMCNC: 28.3 PG — SIGNIFICANT CHANGE UP (ref 27–34)
MCHC RBC-ENTMCNC: 28.4 PG — SIGNIFICANT CHANGE UP (ref 27–34)
MCHC RBC-ENTMCNC: 30.8 GM/DL — LOW (ref 32–36)
MCHC RBC-ENTMCNC: 32.8 GM/DL — SIGNIFICANT CHANGE UP (ref 32–36)
MCV RBC AUTO: 86.5 FL — SIGNIFICANT CHANGE UP (ref 80–100)
MCV RBC AUTO: 91.7 FL — SIGNIFICANT CHANGE UP (ref 80–100)
MONOCYTES # BLD AUTO: 0.33 K/UL — SIGNIFICANT CHANGE UP (ref 0–0.9)
MONOCYTES NFR BLD AUTO: 1.9 % — LOW (ref 2–14)
NEUTROPHILS # BLD AUTO: 15.38 K/UL — HIGH (ref 1.8–7.4)
NEUTROPHILS NFR BLD AUTO: 87.2 % — HIGH (ref 43–77)
NRBC # BLD: 0 /100 WBCS — SIGNIFICANT CHANGE UP (ref 0–0)
NRBC # BLD: 0 /100 WBCS — SIGNIFICANT CHANGE UP (ref 0–0)
PLATELET # BLD AUTO: 346 K/UL — SIGNIFICANT CHANGE UP (ref 150–400)
PLATELET # BLD AUTO: 506 K/UL — HIGH (ref 150–400)
POTASSIUM SERPL-MCNC: 3.3 MMOL/L — LOW (ref 3.5–5.3)
POTASSIUM SERPL-MCNC: 3.7 MMOL/L — SIGNIFICANT CHANGE UP (ref 3.5–5.3)
POTASSIUM SERPL-SCNC: 3.3 MMOL/L — LOW (ref 3.5–5.3)
POTASSIUM SERPL-SCNC: 3.7 MMOL/L — SIGNIFICANT CHANGE UP (ref 3.5–5.3)
PROT SERPL-MCNC: 7.9 G/DL — SIGNIFICANT CHANGE UP (ref 6–8.3)
RBC # BLD: 4.44 M/UL — SIGNIFICANT CHANGE UP (ref 3.8–5.2)
RBC # BLD: 4.81 M/UL — SIGNIFICANT CHANGE UP (ref 3.8–5.2)
RBC # FLD: 14 % — SIGNIFICANT CHANGE UP (ref 10.3–14.5)
RBC # FLD: 14.6 % — HIGH (ref 10.3–14.5)
SODIUM SERPL-SCNC: 135 MMOL/L — SIGNIFICANT CHANGE UP (ref 135–145)
SODIUM SERPL-SCNC: 136 MMOL/L — SIGNIFICANT CHANGE UP (ref 135–145)
TROPONIN I, HIGH SENSITIVITY RESULT: 137.4 NG/L — HIGH
TROPONIN I, HIGH SENSITIVITY RESULT: 162.3 NG/L — HIGH
TROPONIN I, HIGH SENSITIVITY RESULT: 203.9 NG/L — HIGH
WBC # BLD: 17.64 K/UL — HIGH (ref 3.8–10.5)
WBC # BLD: 18.11 K/UL — HIGH (ref 3.8–10.5)
WBC # FLD AUTO: 17.64 K/UL — HIGH (ref 3.8–10.5)
WBC # FLD AUTO: 18.11 K/UL — HIGH (ref 3.8–10.5)

## 2022-04-04 PROCEDURE — 99254 IP/OBS CNSLTJ NEW/EST MOD 60: CPT

## 2022-04-04 PROCEDURE — 43775 LAP SLEEVE GASTRECTOMY: CPT

## 2022-04-04 PROCEDURE — 93010 ELECTROCARDIOGRAM REPORT: CPT

## 2022-04-04 PROCEDURE — 74177 CT ABD & PELVIS W/CONTRAST: CPT | Mod: 26

## 2022-04-04 PROCEDURE — 71275 CT ANGIOGRAPHY CHEST: CPT | Mod: 26

## 2022-04-04 PROCEDURE — 93306 TTE W/DOPPLER COMPLETE: CPT | Mod: 26

## 2022-04-04 PROCEDURE — S2900 ROBOTIC SURGICAL SYSTEM: CPT | Mod: NC

## 2022-04-04 PROCEDURE — 43775 LAP SLEEVE GASTRECTOMY: CPT | Mod: AS

## 2022-04-04 DEVICE — SEALANT VISTASEAL FIBRIN HUMAN 4ML 4/PK: Type: IMPLANTABLE DEVICE | Status: FUNCTIONAL

## 2022-04-04 DEVICE — XI STAPLER SUREFORM RELOAD 60 WHITE: Type: IMPLANTABLE DEVICE | Status: FUNCTIONAL

## 2022-04-04 DEVICE — XI STAPLER SUREFORM RELOAD 60 BLACK: Type: IMPLANTABLE DEVICE | Status: FUNCTIONAL

## 2022-04-04 DEVICE — XI STAPLER SUREFORM RELOAD 60 GREEN: Type: IMPLANTABLE DEVICE | Status: FUNCTIONAL

## 2022-04-04 DEVICE — XI STAPLER SUREFORM RELOAD 60 BLUE: Type: IMPLANTABLE DEVICE | Status: FUNCTIONAL

## 2022-04-04 DEVICE — SEALANT VISTASEAL FIBRIN HUMAN 4ML: Type: IMPLANTABLE DEVICE | Status: FUNCTIONAL

## 2022-04-04 DEVICE — LIGATING CLIPS SYNOVIS SUPERFINE MICROCLIP 6: Type: IMPLANTABLE DEVICE | Status: FUNCTIONAL

## 2022-04-04 DEVICE — STAPLER COVIDIEN TRI-STAPLE 60MM BLACK INTELLIGENT RELOAD: Type: IMPLANTABLE DEVICE | Status: FUNCTIONAL

## 2022-04-04 DEVICE — CLIP APPLIER COVIDIEN ENDOCLIP III 5MM: Type: IMPLANTABLE DEVICE | Status: FUNCTIONAL

## 2022-04-04 DEVICE — STAPLER COVIDIEN TRI-STAPLE 45MM PURPLE INTELLIGENT RELOAD: Type: IMPLANTABLE DEVICE | Status: FUNCTIONAL

## 2022-04-04 RX ORDER — SODIUM CHLORIDE 9 MG/ML
1000 INJECTION, SOLUTION INTRAVENOUS
Refills: 0 | Status: DISCONTINUED | OUTPATIENT
Start: 2022-04-04 | End: 2022-04-05

## 2022-04-04 RX ORDER — SODIUM CHLORIDE 9 MG/ML
3 INJECTION INTRAMUSCULAR; INTRAVENOUS; SUBCUTANEOUS EVERY 8 HOURS
Refills: 0 | Status: DISCONTINUED | OUTPATIENT
Start: 2022-04-04 | End: 2022-04-04

## 2022-04-04 RX ORDER — ONDANSETRON 8 MG/1
4 TABLET, FILM COATED ORAL EVERY 6 HOURS
Refills: 0 | Status: DISCONTINUED | OUTPATIENT
Start: 2022-04-04 | End: 2022-04-05

## 2022-04-04 RX ORDER — HYDROMORPHONE HYDROCHLORIDE 2 MG/ML
1 INJECTION INTRAMUSCULAR; INTRAVENOUS; SUBCUTANEOUS
Refills: 0 | Status: DISCONTINUED | OUTPATIENT
Start: 2022-04-04 | End: 2022-04-04

## 2022-04-04 RX ORDER — GABAPENTIN 400 MG/1
300 CAPSULE ORAL ONCE
Refills: 0 | Status: COMPLETED | OUTPATIENT
Start: 2022-04-04 | End: 2022-04-04

## 2022-04-04 RX ORDER — FENTANYL CITRATE 50 UG/ML
25 INJECTION INTRAVENOUS ONCE
Refills: 0 | Status: DISCONTINUED | OUTPATIENT
Start: 2022-04-04 | End: 2022-04-04

## 2022-04-04 RX ORDER — SCOPALAMINE 1 MG/3D
1 PATCH, EXTENDED RELEASE TRANSDERMAL ONCE
Refills: 0 | Status: COMPLETED | OUTPATIENT
Start: 2022-04-04 | End: 2022-04-04

## 2022-04-04 RX ORDER — ACETAMINOPHEN 500 MG
1000 TABLET ORAL EVERY 6 HOURS
Refills: 0 | Status: DISCONTINUED | OUTPATIENT
Start: 2022-04-04 | End: 2022-04-05

## 2022-04-04 RX ORDER — HYDROMORPHONE HYDROCHLORIDE 2 MG/ML
0.5 INJECTION INTRAMUSCULAR; INTRAVENOUS; SUBCUTANEOUS
Refills: 0 | Status: DISCONTINUED | OUTPATIENT
Start: 2022-04-04 | End: 2022-04-05

## 2022-04-04 RX ORDER — ENOXAPARIN SODIUM 100 MG/ML
40 INJECTION SUBCUTANEOUS EVERY 12 HOURS
Refills: 0 | Status: DISCONTINUED | OUTPATIENT
Start: 2022-04-04 | End: 2022-04-04

## 2022-04-04 RX ORDER — ENOXAPARIN SODIUM 100 MG/ML
40 INJECTION SUBCUTANEOUS EVERY 12 HOURS
Refills: 0 | Status: DISCONTINUED | OUTPATIENT
Start: 2022-04-05 | End: 2022-04-05

## 2022-04-04 RX ORDER — HYDRALAZINE HCL 50 MG
5 TABLET ORAL ONCE
Refills: 0 | Status: COMPLETED | OUTPATIENT
Start: 2022-04-04 | End: 2022-04-04

## 2022-04-04 RX ORDER — METOCLOPRAMIDE HCL 10 MG
10 TABLET ORAL EVERY 6 HOURS
Refills: 0 | Status: DISCONTINUED | OUTPATIENT
Start: 2022-04-04 | End: 2022-04-05

## 2022-04-04 RX ORDER — PANTOPRAZOLE SODIUM 20 MG/1
40 TABLET, DELAYED RELEASE ORAL EVERY 24 HOURS
Refills: 0 | Status: DISCONTINUED | OUTPATIENT
Start: 2022-04-04 | End: 2022-04-05

## 2022-04-04 RX ORDER — HYOSCYAMINE SULFATE 0.13 MG
0.12 TABLET ORAL EVERY 4 HOURS
Refills: 0 | Status: DISCONTINUED | OUTPATIENT
Start: 2022-04-04 | End: 2022-04-05

## 2022-04-04 RX ORDER — ENOXAPARIN SODIUM 100 MG/ML
40 INJECTION SUBCUTANEOUS ONCE
Refills: 0 | Status: COMPLETED | OUTPATIENT
Start: 2022-04-04 | End: 2022-04-04

## 2022-04-04 RX ADMIN — ENOXAPARIN SODIUM 40 MILLIGRAM(S): 100 INJECTION SUBCUTANEOUS at 07:10

## 2022-04-04 RX ADMIN — Medication 5 MILLIGRAM(S): at 17:55

## 2022-04-04 RX ADMIN — Medication 10 MILLIGRAM(S): at 20:08

## 2022-04-04 RX ADMIN — SCOPALAMINE 1 PATCH: 1 PATCH, EXTENDED RELEASE TRANSDERMAL at 07:11

## 2022-04-04 RX ADMIN — PANTOPRAZOLE SODIUM 40 MILLIGRAM(S): 20 TABLET, DELAYED RELEASE ORAL at 14:38

## 2022-04-04 RX ADMIN — FENTANYL CITRATE 25 MICROGRAM(S): 50 INJECTION INTRAVENOUS at 13:00

## 2022-04-04 RX ADMIN — SODIUM CHLORIDE 100 MILLILITER(S): 9 INJECTION, SOLUTION INTRAVENOUS at 21:39

## 2022-04-04 RX ADMIN — FENTANYL CITRATE 25 MICROGRAM(S): 50 INJECTION INTRAVENOUS at 12:49

## 2022-04-04 RX ADMIN — SCOPALAMINE 1 PATCH: 1 PATCH, EXTENDED RELEASE TRANSDERMAL at 22:00

## 2022-04-04 RX ADMIN — ONDANSETRON 4 MILLIGRAM(S): 8 TABLET, FILM COATED ORAL at 19:31

## 2022-04-04 RX ADMIN — Medication 400 MILLIGRAM(S): at 19:31

## 2022-04-04 RX ADMIN — Medication 10 MILLIGRAM(S): at 14:37

## 2022-04-04 RX ADMIN — GABAPENTIN 300 MILLIGRAM(S): 400 CAPSULE ORAL at 07:52

## 2022-04-04 NOTE — PATIENT PROFILE ADULT - FALL HARM RISK - HARM RISK INTERVENTIONS

## 2022-04-04 NOTE — BRIEF OPERATIVE NOTE - OPERATION/FINDINGS
No hiatal hernia seen.  Longitudinal sleeve gastrectomy formed over 36Fr [id number], with staple line reinforcement and negative leak test.

## 2022-04-04 NOTE — CONSULT NOTE ADULT - SUBJECTIVE AND OBJECTIVE BOX
CHIEF COMPLAINT:      HPI:  46 year old morbidly obese female with pmhx of GERD, Ovarian cyst, osteoarthritis, ABRAM (recent study done 10/1/21 neg), hiatal hernia, herniated lumbar disc and tendinitis of hip presented for scheduled robotic assisted laparoscopic sleeve gastrectomy.  Patient noted to develop midsternal chest pain post surgery.  Patient describes chest pain as constant, dull ( 6/10), radiating to the back. Patient denies palpitations, syncope, shortness of breath, LE edema, PND/orthopnea.       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 1000 milliGRAM(s) IV Intermittent every 6 hours  lactated ringers. 1000 milliLiter(s) (150 mL/Hr) IV Continuous <Continuous>  lactated ringers. 1000 milliLiter(s) (100 mL/Hr) IV Continuous <Continuous>  metoclopramide Injectable 10 milliGRAM(s) IV Push every 6 hours  ondansetron Injectable 4 milliGRAM(s) IV Push every 6 hours  pantoprazole  Injectable 40 milliGRAM(s) IV Push every 24 hours    MEDICATIONS  (PRN):  HYDROmorphone  Injectable 1 milliGRAM(s) IV Push every 30 minutes PRN Moderate Pain (4 - 6)  HYDROmorphone  Injectable 0.5 milliGRAM(s) IV Push every 3 hours PRN breakthrough pain  hyoscyamine SL 0.125 milliGRAM(s) SubLingual every 4 hours PRN Gastric Spasms  ondansetron Injectable 4 milliGRAM(s) IV Push every 6 hours PRN Nausea      FAMILY HISTORY:  Family history of stroke-father    Family history of congestive heart failure-mother        ***No family history of premature coronary artery disease or sudden cardiac death    SOCIAL HISTORY:  Smoking-denies  Alcohol-denies  Illicit Drug use-denies    REVIEW OF SYSTEMS:  Constitutional: [ ] fever, [ ]weight loss,  [ ]fatigue  Eyes: [ ] visual changes  Respiratory: [ ]shortness of breath;  [ ] cough, [ ]wheezing, [ ]chills, [ ]hemoptysis  Cardiovascular: [x ] chest pain, [ ]palpitations, [ ]dizziness,  [ ]leg swelling [ ]syncope  Gastrointestinal: [ ] abdominal pain, [ ]nausea, [ ]vomiting,  [ ]diarrhea   Genitourinary: [ ] dysuria, [ ] hematuria  Neurologic: [ ] headaches [ ] tremors  [ ] weakness [ ] lightheadedness  Skin: [ ] itching, [ ]burning, [ ] rashes  Endocrine: [ ] heat or cold intolerance  Musculoskeletal: [ ] joint pain or swelling; [ ] muscle, back, or extremity pain  Psychiatric: [ ] depression, [ ]anxiety, [ ]mood swings, or [ ]difficulty sleeping  Hematologic: [ ] easy bruising, [ ] bleeding gums     [ x] All others negative	  [ ] Unable to obtain    Vital Signs Last 24 Hrs  T(C): 36.3 (2022 11:21), Max: 36.6 (2022 06:49)  T(F): 97.3 (2022 11:21), Max: 97.9 (2022 06:49)  HR: 77 (2022 14:30) (75 - 91)  BP: 153/90 (2022 14:30) (129/82 - 157/87)  BP(mean): 115 (2022 14:30) (92 - 115)  RR: 18 (2022 14:30) (16 - 23)  SpO2: 100% (2022 14:30) (100% - 100%)  I&O's Summary    2022 07:01  -  2022 15:00  --------------------------------------------------------  IN: 1300 mL / OUT: 0 mL / NET: 1300 mL        PHYSICAL EXAM:  General: No acute distress  HEENT: EOMI  Neck:  No JVD  Lungs: Clear to auscultation bilaterally; No rales or wheezing  Heart: Regular rate and rhythm; No murmurs, rubs, or gallops  Abdomen: soft, non tender, non distended   Extremities: warm, no edema   Nervous system:  Alert & Oriented X3  Psychiatric: Normal affect  Skin: No rashes or lesions    LABS:      135  |  106  |  6<L>  ----------------------------<  160<H>  3.7   |  20<L>  |  0.87    Ca    8.9      2022 14:14      Creatinine Trend: 0.87<--                        13.6   17.64 )-----------( 346      ( 2022 14:14 )             44.1         ECG [my interpretation]:  2022 NSR with 1st degree block, prolonged QT, left atrial enlargement and left venrticular hypertrophy  HR 76 with TWi I, AVL, V6. OT prolongation      TELEMETRY:  NSR

## 2022-04-04 NOTE — CONSULT NOTE ADULT - NS ATTEND AMEND GEN_ALL_CORE FT
46F with morbid obesity, ABRAM, arthritis who underwent robot-assisted sleeve gastrectomy with cc of chest discomfort. Pt describes her pain as 6/10 in intensity, dull in quality, substernal and radiating to the back. States it has been constant since she woke up from anesthesia, not relieved with pain medications which she has received. Prior to procedure Pt states she could do >4METS without cp/sob.   Currently denies sob, dizziness, lightheadedness.     With regards to cardiac w/u prior to procedure today, pt had a nuclear stress test 8/22 which noted perfusion defects in anterior and anterosapical walls suggestive of ischemia. This prompted CTA coronaries which were noted to have no significant stenosis with CAC 0.       Vitals signs noted     PHYSICAL EXAM:  General: uncomfortable appearing  HEENT: EOMI  Neck:  No JVD - however exam limited due to habitus   Lungs: Clear to auscultation bilaterally; No rales or wheezing  Heart: Regular rate and rhythm; soft TOMMY at apex   Abdomen: soft, non tender, non distended   Extremities: warm, no edema   Nervous system:  Alert & Oriented X3  Psychiatric: Normal affect  Skin: No rashes or lesions on torso    TTE summary 8/9/2021: LVEF 50-55%; normal RV size and function, mild-mod MR   CTA 8/16/2021 "1. No significant stenosis." CAC 0    Telemetry: NSR  ECG 13:29: SR 1st degree AV block, LVH, TWI lateral leads, prolonged QT      46F with morbid obesity, ABRAM, arthritis who underwent robot-assisted sleeve gastrectomy with cc of chest discomfort.  Given no significant stenosis noted on CTA with CAC 0, the likelihood of cp resulting from obstructive CAD is lower. Given nature of cp radiating to the back and Pt being post procedure, PE should be ruled out as well as AO dissection.   - CTA chest to r/o PE and Aortic dissection   - troponin x2   - repeat ECG  - TTE to assess EF and WMA  - avoid QT prolonging agents 46F with morbid obesity, ABRAM, arthritis who underwent robot-assisted sleeve gastrectomy with cc of chest discomfort. Pt describes her pain as 6/10 in intensity, dull in quality, substernal and radiating to the back. States it has been constant since she woke up from anesthesia, not relieved with pain medications which she has received. Prior to procedure Pt states she could do >4METS without cp/sob.   Currently denies sob, dizziness, lightheadedness.     With regards to cardiac w/u prior to procedure today, pt had a nuclear stress test 8/22 which noted perfusion defects in anterior and anterosapical walls suggestive of ischemia. This prompted CTA coronaries which were noted to have no significant stenosis with CAC 0.       Vitals signs noted     PHYSICAL EXAM:  General: uncomfortable appearing  HEENT: EOMI  Neck:  No JVD - however exam limited due to habitus   Lungs: Clear to auscultation bilaterally; No rales or wheezing  Heart: Regular rate and rhythm; soft TOMMY at apex   Abdomen: soft, non tender, non distended   Extremities: warm, no edema   Nervous system:  Alert & Oriented X3  Psychiatric: Normal affect  Skin: No rashes or lesions on torso    TTE summary 8/9/2021: LVEF 50-55%; normal RV size and function, mild-mod MR   CTA 8/16/2021 "1. No significant stenosis." CAC 0    Telemetry: NSR  ECG 13:29: SR 1st degree AV block, LVH, TWI lateral leads, prolonged QT      46F with morbid obesity, ABRAM, arthritis who underwent robot-assisted sleeve gastrectomy with cc of chest discomfort.  Given no significant stenosis noted on CTA with CAC 0, the likelihood of cp resulting from obstructive CAD is lower. Given nature of cp radiating to the back and Pt being post procedure, PE should be ruled out as well as AO dissection.   - CTA chest to r/o PE and Aortic dissection to be done urgently - would consider empiric anticoagulation, however pt is s/p surgery and AO dissection is also on ddx given instrumentation in the region of abd aorta.   - troponin x2   - repeat ECG  - TTE to assess EF and WMA  - avoid QT prolonging agents    Discussed with  and PACU nurse

## 2022-04-04 NOTE — ASU PREOP CHECKLIST - SELECT TESTS ORDERED
UCG and T&S sent stat preop as per np order (drawn by ANURAG Weaver)/Type and Screen/UCG/Results in MD note/COVID-19

## 2022-04-04 NOTE — ASU PREOP CHECKLIST - 1.
pt. stated she "fell and fractured two of her left toes on monday 3/28/22"---Dr. Schulz aware preop. pt. stated she "fell and fractured two of her left toes on Monday 3/28/22"---Dr. Schulz and Dr. Brown aware preop.

## 2022-04-04 NOTE — PACU DISCHARGE NOTE - HYDRATION STATUS:
[facility]        NAME: Pelon Beasley is a 32 y o  female  : 1991    MRN: [id number]  DATE: 2018  TIME: 5:30 PM    Assessment and Plan   Right leg pain [M79 [id number]]  1  Right leg pain  naproxen (NAPROSYN) 500 mg tablet         Patient Instructions     Patient Instructions   Rest, elevate right leg, limit activity  Stop ibuprofen (Motrin/Advil)  Naprosyn twice a day for pain and inflammation (please take with food)  Ice to painful areas 2 to 3 times a day for 15-20 minutes  Recheck/follow-up with family physician/Orthopedics as discussed  Lloyd Kaiser   [phone]    Please go to the hospital emergency department if needed  Follow up with PCP in 3-5 days  Proceed to  ER if symptoms worsen  Chief Complaint     Chief Complaint   Patient presents with    Knee Pain     r leg and knee pain started on friday    med with motirn in effective   no fall or injury         History of Present Illness       Patient states she started with right leg pain (right hip, right upper leg, right knee) while walking last Friday (2018); no known injury; patient states she has been taking ibuprofen without relief        Review of Systems   Review of Systems   Musculoskeletal:        Right leg (right hip, right upper leg, right knee) pain         Current Medications       Current Outpatient Prescriptions:     acetaminophen (TYLENOL) 500 mg tablet, Take 1 tablet (500 mg total) by mouth every 6 (six) hours as needed for mild pain, Disp: 30 tablet, Rfl: 0    ibuprofen (MOTRIN) 400 mg tablet, Take 1 tablet (400 mg total) by mouth every 6 (six) hours as needed for mild pain, Disp: 30 tablet, Rfl: 0    norgestimate-ethinyl estradiol (ORTHO-CYCLEN) 0 25-35 MG-MCG per tablet, Take 1 tablet by mouth daily, Disp: 28 tablet, Rfl: 11    omeprazole (PriLOSEC) 20 mg delayed release capsule, TAKE 1 CAPSULE BY MOUTH EVERY DAY, Disp: 15 capsule, Rfl: 0    metroNIDAZOLE (FLAGYL) 500 mg tablet, Take 1 tablet (500 mg total) by mouth every 12 (twelve) hours for 7 days (Patient not taking: Reported on 11/29/2018 ), Disp: 14 tablet, Rfl: 0    naproxen (NAPROSYN) 500 mg tablet, Take 1 tablet (500 mg total) by mouth 2 (two) times a day with meals for 30 doses, Disp: 30 tablet, Rfl: 0    Current Allergies     Allergies as of 11/29/2018 - Reviewed 11/29/2018   Allergen Reaction Noted    Pollen extract  04/20/2018            The following portions of the patient's history were reviewed and updated as appropriate: allergies, current medications, past family history, past medical history, past social history, past surgical history and problem list      Past Medical History:   Diagnosis Date    Anemia     Last assessed 4/10/2015    Heart murmur     Hemorrhoids     Migraine     Obesity     Last assessed 12/30/2013    Seasonal allergies        Past Surgical History:   Procedure Laterality Date    ABDOMINOPLASTY      AR HEMORRHOIDECTOMY,INT/EXT, 2+ COLUMNS/GROUPS N/A 3/10/2017    Procedure: HEMORRHOIDECTOMY ;  Surgeon: Mckenna Pimentel MD;  Location: [facility];  Service: Colorectal    TUBAL LIGATION         Family History   Problem Relation Age of Onset    Anemia Mother     Hypertension Mother     Down syndrome Brother     Diabetes Maternal Grandmother     Alzheimer's disease Paternal Grandmother          Medications have been verified          Objective   /77   Pulse 68   Resp 16   Ht 5' 4" (1 626 m)   Wt 82 6 kg (182 lb)   LMP 11/07/2018   SpO2 100%   BMI 31 24 kg/m²        Physical Exam     Physical Exam   Musculoskeletal:   Generalized discomfort of the right hip, right upper leg, and right knee areas - worse with weight-bearing and ambulation Satisfactory

## 2022-04-04 NOTE — CONSULT NOTE ADULT - ASSESSMENT
46 year old morbidly obese female with pmhx of GERD, Ovarian cyst, osteoarthritis, ABRAM (recent study done 10/1/21 neg), hiatal hernia, herniated lumbar disc and tendinitis of hip presented for scheduled robotic assisted laparoscopic sleeve gastrectomy.  Patient noted to develop midsternal chest pain post surgery.  Patient describes chest pain as constant, dull ( 6/10), radiating to the back. Patient denies palpitations, syncope, shortness of breath, LE edema, PND/orthopnea. Cardiology was consulted for further management

## 2022-04-05 ENCOUNTER — TRANSCRIPTION ENCOUNTER (OUTPATIENT)
Age: 47
End: 2022-04-05

## 2022-04-05 ENCOUNTER — RESULT REVIEW (OUTPATIENT)
Age: 47
End: 2022-04-05

## 2022-04-05 VITALS
DIASTOLIC BLOOD PRESSURE: 89 MMHG | OXYGEN SATURATION: 98 % | HEART RATE: 96 BPM | SYSTOLIC BLOOD PRESSURE: 153 MMHG | TEMPERATURE: 98 F | RESPIRATION RATE: 18 BRPM

## 2022-04-05 LAB
ANION GAP SERPL CALC-SCNC: 9 MMOL/L — SIGNIFICANT CHANGE UP (ref 5–17)
BASOPHILS # BLD AUTO: 0.02 K/UL — SIGNIFICANT CHANGE UP (ref 0–0.2)
BASOPHILS NFR BLD AUTO: 0.1 % — SIGNIFICANT CHANGE UP (ref 0–2)
BUN SERPL-MCNC: 5 MG/DL — LOW (ref 7–18)
CALCIUM SERPL-MCNC: 9.3 MG/DL — SIGNIFICANT CHANGE UP (ref 8.4–10.5)
CHLORIDE SERPL-SCNC: 102 MMOL/L — SIGNIFICANT CHANGE UP (ref 96–108)
CO2 SERPL-SCNC: 25 MMOL/L — SIGNIFICANT CHANGE UP (ref 22–31)
CREAT SERPL-MCNC: 0.75 MG/DL — SIGNIFICANT CHANGE UP (ref 0.5–1.3)
EGFR: 98 ML/MIN/1.73M2 — SIGNIFICANT CHANGE UP
EOSINOPHIL # BLD AUTO: 0 K/UL — SIGNIFICANT CHANGE UP (ref 0–0.5)
EOSINOPHIL NFR BLD AUTO: 0 % — SIGNIFICANT CHANGE UP (ref 0–6)
GLUCOSE BLDC GLUCOMTR-MCNC: 91 MG/DL — SIGNIFICANT CHANGE UP (ref 70–99)
GLUCOSE SERPL-MCNC: 84 MG/DL — SIGNIFICANT CHANGE UP (ref 70–99)
HCT VFR BLD CALC: 39.6 % — SIGNIFICANT CHANGE UP (ref 34.5–45)
HGB BLD-MCNC: 13.1 G/DL — SIGNIFICANT CHANGE UP (ref 11.5–15.5)
IMM GRANULOCYTES NFR BLD AUTO: 0.4 % — SIGNIFICANT CHANGE UP (ref 0–1.5)
LYMPHOCYTES # BLD AUTO: 16.9 % — SIGNIFICANT CHANGE UP (ref 13–44)
LYMPHOCYTES # BLD AUTO: 2.77 K/UL — SIGNIFICANT CHANGE UP (ref 1–3.3)
MAGNESIUM SERPL-MCNC: 1.6 MG/DL — SIGNIFICANT CHANGE UP (ref 1.6–2.6)
MCHC RBC-ENTMCNC: 28.5 PG — SIGNIFICANT CHANGE UP (ref 27–34)
MCHC RBC-ENTMCNC: 33.1 GM/DL — SIGNIFICANT CHANGE UP (ref 32–36)
MCV RBC AUTO: 86.3 FL — SIGNIFICANT CHANGE UP (ref 80–100)
MONOCYTES # BLD AUTO: 1.35 K/UL — HIGH (ref 0–0.9)
MONOCYTES NFR BLD AUTO: 8.2 % — SIGNIFICANT CHANGE UP (ref 2–14)
NEUTROPHILS # BLD AUTO: 12.18 K/UL — HIGH (ref 1.8–7.4)
NEUTROPHILS NFR BLD AUTO: 74.4 % — SIGNIFICANT CHANGE UP (ref 43–77)
NRBC # BLD: 0 /100 WBCS — SIGNIFICANT CHANGE UP (ref 0–0)
PHOSPHATE SERPL-MCNC: 3.1 MG/DL — SIGNIFICANT CHANGE UP (ref 2.5–4.5)
PLATELET # BLD AUTO: 537 K/UL — HIGH (ref 150–400)
POTASSIUM SERPL-MCNC: 3.4 MMOL/L — LOW (ref 3.5–5.3)
POTASSIUM SERPL-SCNC: 3.4 MMOL/L — LOW (ref 3.5–5.3)
RBC # BLD: 4.59 M/UL — SIGNIFICANT CHANGE UP (ref 3.8–5.2)
RBC # FLD: 14.4 % — SIGNIFICANT CHANGE UP (ref 10.3–14.5)
SODIUM SERPL-SCNC: 136 MMOL/L — SIGNIFICANT CHANGE UP (ref 135–145)
TROPONIN I, HIGH SENSITIVITY RESULT: 165.1 NG/L — HIGH
WBC # BLD: 16.38 K/UL — HIGH (ref 3.8–10.5)
WBC # FLD AUTO: 16.38 K/UL — HIGH (ref 3.8–10.5)

## 2022-04-05 PROCEDURE — 88312 SPECIAL STAINS GROUP 1: CPT

## 2022-04-05 PROCEDURE — 71275 CT ANGIOGRAPHY CHEST: CPT

## 2022-04-05 PROCEDURE — 86850 RBC ANTIBODY SCREEN: CPT

## 2022-04-05 PROCEDURE — 85025 COMPLETE CBC W/AUTO DIFF WBC: CPT

## 2022-04-05 PROCEDURE — S2900: CPT

## 2022-04-05 PROCEDURE — 86901 BLOOD TYPING SEROLOGIC RH(D): CPT

## 2022-04-05 PROCEDURE — 83735 ASSAY OF MAGNESIUM: CPT

## 2022-04-05 PROCEDURE — 86900 BLOOD TYPING SEROLOGIC ABO: CPT

## 2022-04-05 PROCEDURE — 74177 CT ABD & PELVIS W/CONTRAST: CPT

## 2022-04-05 PROCEDURE — 82962 GLUCOSE BLOOD TEST: CPT

## 2022-04-05 PROCEDURE — 84484 ASSAY OF TROPONIN QUANT: CPT

## 2022-04-05 PROCEDURE — 36415 COLL VENOUS BLD VENIPUNCTURE: CPT

## 2022-04-05 PROCEDURE — 99233 SBSQ HOSP IP/OBS HIGH 50: CPT

## 2022-04-05 PROCEDURE — 84100 ASSAY OF PHOSPHORUS: CPT

## 2022-04-05 PROCEDURE — C1889: CPT

## 2022-04-05 PROCEDURE — 82550 ASSAY OF CK (CPK): CPT

## 2022-04-05 PROCEDURE — 81025 URINE PREGNANCY TEST: CPT

## 2022-04-05 PROCEDURE — 93306 TTE W/DOPPLER COMPLETE: CPT

## 2022-04-05 PROCEDURE — 80053 COMPREHEN METABOLIC PANEL: CPT

## 2022-04-05 PROCEDURE — 88312 SPECIAL STAINS GROUP 1: CPT | Mod: 26

## 2022-04-05 PROCEDURE — 85027 COMPLETE CBC AUTOMATED: CPT

## 2022-04-05 PROCEDURE — 88307 TISSUE EXAM BY PATHOLOGIST: CPT | Mod: 26

## 2022-04-05 PROCEDURE — 82553 CREATINE MB FRACTION: CPT

## 2022-04-05 PROCEDURE — 80048 BASIC METABOLIC PNL TOTAL CA: CPT

## 2022-04-05 PROCEDURE — 93005 ELECTROCARDIOGRAM TRACING: CPT

## 2022-04-05 PROCEDURE — 88307 TISSUE EXAM BY PATHOLOGIST: CPT

## 2022-04-05 RX ORDER — ACETAMINOPHEN 500 MG
2 TABLET ORAL
Qty: 30 | Refills: 0
Start: 2022-04-05 | End: 2022-04-09

## 2022-04-05 RX ORDER — ACETAMINOPHEN 500 MG
650 TABLET ORAL EVERY 6 HOURS
Refills: 0 | Status: DISCONTINUED | OUTPATIENT
Start: 2022-04-05 | End: 2022-04-05

## 2022-04-05 RX ORDER — ONDANSETRON 8 MG/1
4 TABLET, FILM COATED ORAL EVERY 6 HOURS
Refills: 0 | Status: DISCONTINUED | OUTPATIENT
Start: 2022-04-05 | End: 2022-04-05

## 2022-04-05 RX ORDER — PANTOPRAZOLE SODIUM 20 MG/1
40 TABLET, DELAYED RELEASE ORAL DAILY
Refills: 0 | Status: DISCONTINUED | OUTPATIENT
Start: 2022-04-05 | End: 2022-04-05

## 2022-04-05 RX ORDER — POTASSIUM CHLORIDE 20 MEQ
10 PACKET (EA) ORAL
Refills: 0 | Status: DISCONTINUED | OUTPATIENT
Start: 2022-04-05 | End: 2022-04-05

## 2022-04-05 RX ORDER — HYOSCYAMINE SULFATE 0.13 MG
1 TABLET ORAL
Qty: 6 | Refills: 0
Start: 2022-04-05

## 2022-04-05 RX ORDER — METOCLOPRAMIDE HCL 10 MG
10 TABLET ORAL EVERY 6 HOURS
Refills: 0 | Status: DISCONTINUED | OUTPATIENT
Start: 2022-04-05 | End: 2022-04-05

## 2022-04-05 RX ORDER — ONDANSETRON 8 MG/1
1 TABLET, FILM COATED ORAL
Qty: 20 | Refills: 0
Start: 2022-04-05 | End: 2022-04-09

## 2022-04-05 RX ORDER — OMEPRAZOLE 10 MG/1
1 CAPSULE, DELAYED RELEASE ORAL
Qty: 30 | Refills: 0
Start: 2022-04-05 | End: 2022-05-04

## 2022-04-05 RX ADMIN — ONDANSETRON 4 MILLIGRAM(S): 8 TABLET, FILM COATED ORAL at 05:41

## 2022-04-05 RX ADMIN — ENOXAPARIN SODIUM 40 MILLIGRAM(S): 100 INJECTION SUBCUTANEOUS at 05:40

## 2022-04-05 RX ADMIN — SCOPALAMINE 1 PATCH: 1 PATCH, EXTENDED RELEASE TRANSDERMAL at 07:04

## 2022-04-05 RX ADMIN — ONDANSETRON 4 MILLIGRAM(S): 8 TABLET, FILM COATED ORAL at 18:17

## 2022-04-05 RX ADMIN — Medication 400 MILLIGRAM(S): at 05:38

## 2022-04-05 RX ADMIN — ENOXAPARIN SODIUM 40 MILLIGRAM(S): 100 INJECTION SUBCUTANEOUS at 18:16

## 2022-04-05 RX ADMIN — ONDANSETRON 4 MILLIGRAM(S): 8 TABLET, FILM COATED ORAL at 11:46

## 2022-04-05 RX ADMIN — Medication 1000 MILLIGRAM(S): at 06:15

## 2022-04-05 RX ADMIN — Medication 10 MILLIGRAM(S): at 06:00

## 2022-04-05 RX ADMIN — Medication 650 MILLIGRAM(S): at 18:16

## 2022-04-05 RX ADMIN — SODIUM CHLORIDE 100 MILLILITER(S): 9 INJECTION, SOLUTION INTRAVENOUS at 01:10

## 2022-04-05 RX ADMIN — Medication 400 MILLIGRAM(S): at 00:08

## 2022-04-05 RX ADMIN — Medication 10 MILLIGRAM(S): at 01:09

## 2022-04-05 RX ADMIN — Medication 10 MILLIGRAM(S): at 11:46

## 2022-04-05 RX ADMIN — Medication 650 MILLIGRAM(S): at 11:45

## 2022-04-05 RX ADMIN — PANTOPRAZOLE SODIUM 40 MILLIGRAM(S): 20 TABLET, DELAYED RELEASE ORAL at 11:45

## 2022-04-05 RX ADMIN — Medication 650 MILLIGRAM(S): at 12:45

## 2022-04-05 RX ADMIN — ONDANSETRON 4 MILLIGRAM(S): 8 TABLET, FILM COATED ORAL at 00:08

## 2022-04-05 RX ADMIN — Medication 1000 MILLIGRAM(S): at 00:29

## 2022-04-05 NOTE — PROGRESS NOTE ADULT - NSPROGADDITIONALINFOA_GEN_ALL_CORE
Surgery Attending    Patient seen and examined by me, and discussed with the surgical team.    Subjective:  Patient reports no abdominal pain this morning, and she states her chest pain has resolved.  Patient has no nausea or emesis, and pain is well-controlled. Patient has been able to tolerate sips of clear liquids as per protocol.  Patient denies chest pain, shortness of breath and has been able to ambulate and void without difficulty.       Objective:  Appears comfortable, in no distress, and is sitting up in bed.    HR 80's regular.    Abdomen soft, non-tender, non-distended.  Dressings intact with some sanguineous staining, but otherwise clean and dry.  Incisions without erythema.    Lungs clear and respiratory effort non-labored.  Proper use of incentive spirometer confirmed.      Assessment/Plan:  Patient doing well POD # 1 s/p sleeve gastrectomy.  Patient has no nausea/emesis, vitals are stable and normal, pain is well controlled. Chest pain resolved, and imaging and bedside echo (performed by Dr. Funes) normal. Troponin downtrending.      Patient may continue with bariatric clear liquids as per protocol since patient is without nausea and is hemodynamically stable.      If able to tolerate clear liquids, remains clinically stable, and has no pain, patient may be discharged to home.    Post-op manual previously given to and reviewed with patient, including post-operative diet/progression, activity, wound care, precautions and follow-up instructions.      All of patient's questions and concerns addressed to patient's satisfaction and patient verbalized an understanding of the information discussed.

## 2022-04-05 NOTE — DISCHARGE NOTE NURSING/CASE MANAGEMENT/SOCIAL WORK - PATIENT PORTAL LINK FT
You can access the FollowMyHealth Patient Portal offered by [facility] by registering at the following website: [email or web address]. By joining [facility]’s FollowMyHealth portal, you will also be able to view your health information using other applications (apps) compatible with our system.

## 2022-04-05 NOTE — DISCHARGE NOTE PROVIDER - HOSPITAL COURSE
46 year old morbidly obese female with pmhx of GERD, Ovarian cyst, osteoarthritis, ABRAM (recent study done 10/1/21 neg), hiatal hernia, herniated lumbar disc and tendinitis of hip presents with c/o failure to loose weight despite trying several dietary modalities, gastric banding (2008, eventually removed in 2019), calorie reduction, portion control and exercise. Patient underwent robotic assisted laparoscopic sleeve gastrectomy on 4/4/2022. Pt tolerated the procedure and recovered from anesthesia without complications. Postoperatively, however, pt developed chest pain associated with elevated troponin. CTA chest was performed to rule out PE and aortic dissection, was negative. EKG and echocardiogram were also within normal limits, thus suspected demand ischemia briefly postop. On POD#1, pt remained hemodynamically stable, no further cardiac events. Cardiology recommended outpt follow up upon discharge. Pt tolerated luisa-clears, will be discharged home with follow up instructions.

## 2022-04-05 NOTE — DISCHARGE NOTE PROVIDER - CARE PROVIDERS DIRECT ADDRESSES
,[email or web address]

## 2022-04-05 NOTE — PROGRESS NOTE ADULT - ASSESSMENT
46 yoF s/p robotic sleeve gastrectomy pod#1    chest pain with elevated troponin, PE/AO disection r/o'ed on CTA  echocardiogram pending read, cards following    - luisa clears  - trend labs  - f/u card recs  - dvt/gi ppx, antiemetics  - oob/ambulate, IS  
46 year old morbidly obese female with pmhx of GERD, Ovarian cyst, osteoarthritis, ABRAM (recent study done 10/1/21 neg), hiatal hernia, herniated lumbar disc and tendinitis of hip presented for scheduled robotic assisted laparoscopic sleeve gastrectomy.  Patient noted to develop midsternal chest pain post surgery.  Patient describes chest pain as constant, dull ( 6/10), radiating to the back. Patient denies palpitations, syncope, shortness of breath, LE edema, PND/orthopnea. 
46y Female s/p Robotic Assisted Sleeve Gastrectomy 4/4, post op chest pain, elevated Troponin levels   ECG w/ new changes   NSR with 1st degree block, prolonged QT, left atrial enlargement and left venrticular hypertrophy    -Cardiology eval noted and appreciated   -CTA Chest, abd and pelvis Urgent, r/o PE, aortic dissection   -TTE Urgent   -Telemetry   -Trend Trop levels  -IVF   -NPO   -Pain medication   -Monitor BP   -D/w Dr Schulz and aware

## 2022-04-05 NOTE — DISCHARGE NOTE PROVIDER - NSDCFUSCHEDAPPT_GEN_ALL_CORE_FT
SHANON VELAZQUEZ M ; 04/13/2022 ; [facility] [place] [address]  SHANON VELAZQUEZ M ; 04/13/2022 ; [facility] [address]  SHANON VELAZQUEZ M ; 05/04/2022 ; [facility] [place] [address]  SHANON VELAZQUEZ M ; 05/04/2022 ; [facility] [address]

## 2022-04-05 NOTE — PROGRESS NOTE ADULT - SUBJECTIVE AND OBJECTIVE BOX
Surgery Post-Op Note    Called by PACU nursing staff, pt w/ chest pain. Pt seen and examined at bedside. Admits to substernal chest pain, pain 6/10, radiating to the back, denies SOB, no dizziness, no lightheadedness.  Offers no other complaints.       Vital Signs Last 24 Hrs  T(C): 36.5 (04 Apr 2022 15:00), Max: 36.6 (04 Apr 2022 06:49)  T(F): 97.7 (04 Apr 2022 15:00), Max: 97.9 (04 Apr 2022 06:49)  HR: 82 (04 Apr 2022 16:50) (75 - 91)  Creatine Kinase, Serum (04.04.22 @ 15:08)    Creatine Kinase, Serum: 312 U/L    BP: 164/98 (04 Apr 2022 16:50) (129/82 - 164/98)  BP(mean): 115 (04 Apr 2022 16:50) (92 - 115)  RR: 20 (04 Apr 2022 16:50) (15 - 23)  SpO2: 100% (04 Apr 2022 16:50) (100% - 100%)    Physical Exam:   GEN: AAOx3, No acute distress  Pulm: Respirations non labored   Cardio: Regular rate and rhythm; No murmurs, rubs, or gallops  ABD: Soft, ND, incisional TTP, dressing C/D/I   Extremities: non edematous, no calf pain bilaterally        04-04 @ 07:01  -  04-04 @ 17:33  --------------------------------------------------------  IN: 1600 mL / OUT: 300 mL / NET: 1300 mL    Troponin I, High Sensitivity (04.04.22 @ 15:08)    Troponin I, High Sensitivity Result: 137.4: TYPE:(C=Critical, N=Notification, A=Abnormal) N  TESTS: _TNIH  DATE/TIME CALLED: _04/04/2022 15:50:40 EDT  CALLED TO: PAUL URBINA RN  READ BACK (2 Patient Identifiers)(Y/N): _Y  READ BACK VALUES (Y/N): _Y  CALLED BY: ALEKSANDR 04/04/2022 15:50:51 EDT  Serial measurements of high sensitivity troponin I (hs TnI) showing rapid  upward or downward changes suggest acute myocardial injury.  Please note  that a sustained elevation of hsTnI can be caused by renal disease,  chronic heart failure, sepsis, pulmonary embolism and other clinical  conditions. ng/L    Creatine Kinase, Serum: 312 U/L (04.04.22 @ 15:08)            
INTERVAL HPI/OVERNIGHT EVENTS:    No acute events overnight.   Pt resting comfortably. No acute complaints.   denies n/v/chest pain  ambulated/voiding    MEDICATIONS  (STANDING):  acetaminophen   IVPB .. 1000 milliGRAM(s) IV Intermittent every 6 hours  enoxaparin Injectable 40 milliGRAM(s) SubCutaneous every 12 hours  lactated ringers. 1000 milliLiter(s) (150 mL/Hr) IV Continuous <Continuous>  lactated ringers. 1000 milliLiter(s) (100 mL/Hr) IV Continuous <Continuous>  metoclopramide Injectable 10 milliGRAM(s) IV Push every 6 hours  ondansetron Injectable 4 milliGRAM(s) IV Push every 6 hours  pantoprazole  Injectable 40 milliGRAM(s) IV Push every 24 hours    MEDICATIONS  (PRN):  HYDROmorphone  Injectable 0.5 milliGRAM(s) IV Push every 3 hours PRN breakthrough pain  hyoscyamine SL 0.125 milliGRAM(s) SubLingual every 4 hours PRN Gastric Spasms  ondansetron Injectable 4 milliGRAM(s) IV Push every 6 hours PRN Nausea      Vital Signs Last 24 Hrs  T(C): 36.8 (05 Apr 2022 05:39), Max: 36.9 (04 Apr 2022 20:51)  T(F): 98.2 (05 Apr 2022 05:39), Max: 98.4 (04 Apr 2022 20:51)  HR: 95 (05 Apr 2022 05:39) (75 - 100)  BP: 159/88 (05 Apr 2022 05:39) (129/82 - 175/90)  BP(mean): 106 (04 Apr 2022 18:20) (92 - 115)  RR: 18 (05 Apr 2022 05:39) (15 - 23)  SpO2: 96% (05 Apr 2022 05:39) (96% - 100%)      PHYSICAL EXAM  General: Alert and oriented, not in acute distress  Resp: Breathing unlabored  Abdomen: Soft, nondistended, nontender, dressing cdi  : No hamilton catheter, no dysuria or hematuria  Extremities: No pedal edema    I&O's Detail    04 Apr 2022 07:01  -  05 Apr 2022 07:00  --------------------------------------------------------  IN:    IV PiggyBack: 1000 mL    Lactated Ringers: 600 mL  Total IN: 1600 mL    OUT:    Voided (mL): 300 mL  Total OUT: 300 mL    Total NET: 1300 mL          LABS:                        13.1   16.38 )-----------( 537      ( 05 Apr 2022 06:39 )             39.6             04-05    136  |  102  |  5<L>  ----------------------------<  84  3.4<L>   |  25  |  0.75    Ca    9.3      05 Apr 2022 06:39  Phos  3.1     04-05  Mg     1.6     04-05    TPro  7.9  /  Alb  3.7  /  TBili  0.3  /  DBili  x   /  AST  122<H>  /  ALT  138<H>  /  AlkPhos  77  04-04      
PRESENTING CC:  chest pain    OVERNIGHT EVENTS:  No events overnight    PMH:   PAST MEDICAL & SURGICAL HISTORY:  GERD (gastroesophageal reflux disease)    Ovarian cyst    Herniation of intervertebral disc of lumbar region    Hiatal hernia    Obstructive sleep apnea on CPAP    Osteoarthritis    Tendonitis of left hip    S/P D&amp;C (status post dilation and curettage)    History of     Benign cyst of right breast    History of arthroscopy of both knees    History of adjustable gastric banding    S/P ACL repair    Status post medial meniscus repair    S/P lumbar discectomy        Allergies    No Known Allergies    Intolerances        MEDICATIONS  (STANDING):  acetaminophen    Suspension .. 650 milliGRAM(s) Oral every 6 hours  enoxaparin Injectable 40 milliGRAM(s) SubCutaneous every 12 hours  lactated ringers. 1000 milliLiter(s) (100 mL/Hr) IV Continuous <Continuous>  metoclopramide   Syrup 10 milliGRAM(s) Oral every 6 hours  metoclopramide Injectable 10 milliGRAM(s) IV Push every 6 hours  ondansetron   Disintegrating Tablet 4 milliGRAM(s) Oral every 6 hours  pantoprazole   Suspension 40 milliGRAM(s) Oral daily    MEDICATIONS  (PRN):  hyoscyamine SL 0.125 milliGRAM(s) SubLingual every 4 hours PRN Gastric Spasms      FAMILY HISTORY:  Family history of stroke    Family history of congestive heart failure      Reviewed; no change from my prior note    SOCIAL HISTORY:  Reviewed, no change from my prior note    REVIEW OF SYSTEMS:  Constitutional: [ ] fever, [ ]weight loss,  [ ]fatigue  Eyes: [ ] visual changes  Respiratory: [ ]shortness of breath;  [ ] cough, [ ]wheezing, [ ]chills, [ ]hemoptysis  Cardiovascular: [ ] chest pain, [ ]palpitations, [ ]dizziness,  [ ]leg swelling [ ]syncope  Gastrointestinal: [ ] abdominal pain, [ ]nausea, [ ]vomiting,  [ ]diarrhea   Genitourinary: [ ] dysuria, [ ] hematuria  Neurologic: [ ] headaches [ ] tremors [ ] weakness [ ] lightheadedness  Skin: [ ] itching, [ ]burning, [ ] rashes  Endocrine: [ ] heat or cold intolerance  Musculoskeletal: [ ] joint pain or swelling; [ ] muscle, back, or extremity pain  Psychiatric: [ ] depression, [ ]anxiety, [ ]mood swings, or [ ]difficulty sleeping  Hematologic: [ ] easy bruising, [ ] bleeding gums    [x] All remaining systems negative except as per above.   [  ] Unable to obtain    Vital Signs Last 24 Hrs  T(C): 36.8 (2022 05:39), Max: 36.9 (2022 20:51)  T(F): 98.2 (2022 05:39), Max: 98.4 (2022 20:51)  HR: 95 (2022 05:39) (75 - 100)  BP: 159/88 (2022 05:39) (129/82 - 175/90)  BP(mean): 106 (2022 18:20) (97 - 115)  RR: 18 (2022 05:39) (15 - 21)  SpO2: 96% (2022 05:39) (96% - 100%)  I&O's Summary    2022 07:01  -  2022 07:00  --------------------------------------------------------  IN: 1600 mL / OUT: 300 mL / NET: 1300 mL        PHYSICAL EXAM:  General: No acute distress  HEENT: EOMI  Neck: No JVD  Lungs: Clear to auscultation bilaterally; No rales or wheezing  Heart: Regular rate and rhythm; No murmurs, rubs, or gallops  Abdomen: Soft, non tender, non distended   Extremities: Warm, no edema   Nervous system:  Alert & Oriented X3  Psychiatric: Normal affect  Skin: No rashes or lesions    LABS:      136  |  102  |  5<L>  ----------------------------<  84  3.4<L>   |  25  |  0.75    Ca    9.3      2022 06:39  Phos  3.1       Mg     1.6         TPro  7.9  /  Alb  3.7  /  TBili  0.3  /  DBili  x   /  AST  122<H>  /  ALT  138<H>  /  AlkPhos  77  04    Creatinine Trend: 0.75<--, 0.76<--, 0.87<--                        13.1   16.38 )-----------( 537      ( 2022 06:39 )             39.6       Lipid Panel:   Cardiac Enzymes: CARDIAC MARKERS ( 2022 17:42 )  x     / x     / 400 U/L / x     / 5.4 ng/mL  CARDIAC MARKERS ( 2022 15:08 )  x     / x     / 312 U/L / x     / 3.8 ng/mL          RADIOLOGY:  < from: CT Abdomen and Pelvis w/ IV Cont (22 @ 18:40) >  IMPRESSION:  No pulmonary embolism or aortic dissection.    Left patchy consolidation, possibly atelectasis or pneumonia.    Subcutaneous emphysema in the right anterior chest and abdominal wall,   probably postsurgical.    --- End of Report ---    < end of copied text >  < from: CT Angio Chest PE Protocol w/ IV Cont (22 @ 18:39) >  IMPRESSION:  No pulmonary embolism or aortic dissection.    Left patchy consolidation, possibly atelectasis or pneumonia.    Subcutaneous emphysema in the right anterior chest and abdominal wall,   probably postsurgical.    < end of copied text >    ECG SR 1st degree AV block, LVH, TWI lateral leads, prolonged QT       TELEMETRY:  NSR    ECHO:< from: Transthoracic Echocardiogram (22 @ 17:25) >  CONCLUSIONS:  1. Normal mitral valve. Mild mitral regurgitation.  2. Trileaflet aortic valve.  3. Normal left ventricular internal dimensions and wall  thicknesses. A false tendon is noted; this is a normal  variant.  4. Normal Left Ventricular Systolic Function,  (EF =56% by  biplane) No regional wall motion abnormalities.  5. Normal right ventricular size and systolic function  (TAPSE 2.5 cm).  6. Unable to estimate RA pressure.  7. TR velocity is 2.6m/s.    *** Compared with echocardiogram report of 2021, no  significant changes noted.  -------------------------------------------    < end of copied text >

## 2022-04-05 NOTE — PROGRESS NOTE ADULT - NS ATTEND AMEND GEN_ALL_CORE FT
- No events overnight. Pain resolved. No hypoxemia or fevers. Patient endorses feeling well and eager to go home. Believes pain was related to gas and bloating.   - CT imaging performed  - TTE was performed in PACU with no RWA    Vitals signs noted     PHYSICAL EXAM:  General: NAD  HEENT: EOMI  Neck:  No JVD - however exam limited due to habitus   Lungs: Clear to auscultation bilaterally; No rales or wheezing  Heart: Regular rate and rhythm; soft TOMMY at apex   Abdomen: soft, non tender, non distended   Extremities: warm, no edema   Nervous system:  Alert & Oriented X3  Psychiatric: Normal affect  Skin: No rashes or lesions on torso    TTE 4/4/2022  "CONCLUSIONS:  1. Normal mitral valve. Mild mitral regurgitation.  2. Trileaflet aortic valve.  3. Normal left ventricular internal dimensions and wall  thicknesses. A false tendon is noted; this is a normal  variant.  4. Normal Left Ventricular Systolic Function,  (EF = 56% by  biplane) No regional wall motion abnormalities.  5. Normal right ventricular size and systolic function  (TAPSE 2.5 cm).  6. Unable to estimate RA pressure.  7. TR velocity is 2.6m/s.    *** Compared with echocardiogram report of 8/9/2021, no  significant changes noted."    CTA 8/16/2021 "1. No significant stenosis." CAC 0    Telemetry: short episode of sinus tachycardia     ECG 13:29: SR 1st degree AV block, LVH, TWI lateral leads, prolonged QT    ECG 4/5/2021 SR LVH prolonged qt TWI I and avL     46F with morbid obesity, ABRAM, arthritis who underwent robot-assisted sleeve gastrectomy with cc of chest discomfort which has since resolved.  Given no significant stenosis noted on CTA with CAC 0, the likelihood of cp resulting from obstructive CAD was low, thus PE and AO dissection were ruled out. CT abd notable for post surgical changes - notably subcutaneous emphysema in the right anterior chest and abdominal wall. Minimal troponin elevation likely demand ischemia due to post surgical state and chest discomfort was related to surgery as well.     -no further cardiac testing indicated at this time  - pt to f/u w cardiologist within 1 week of discharge - please ensure she has appt with .

## 2022-04-05 NOTE — PROGRESS NOTE ADULT - PROBLEM SELECTOR PLAN 1
-resolved today likely due to surgery site  -CT chest -unremarkable  -high selectivity troponin peaked at 203 likely due to demand ischemia (Type II MI) post surgery  -echo as above  -Patient to follow up with Dr. Da Silva in one week

## 2022-04-05 NOTE — DISCHARGE NOTE PROVIDER - NSDCCPTREATMENT_GEN_ALL_CORE_FT
PRINCIPAL PROCEDURE  Procedure: Robot-assisted sleeve gastrectomy  Findings and Treatment: - can remove dressing in 48 hours  - do not take steri-strips off, allow them to fall off on their own  - do not lift anything heavier than 10 lbs for 2-4 weeks, avoid strenuous activity for 2 weeks  - take over the counter medications for pain, tylenol crushed every 8 hours, and levsin if crampy pain-as directed  - avoid NSAIDs (ibuprofen)  - take zofran as needed for nasuea  - take omeprazole daily   - follow dietray instructions given in the packet  - shower as necessary, do not soak or bathe until cleared by surgeon  - follow up with surgeon in 2 weeks, call to schedule an appointment

## 2022-04-05 NOTE — DISCHARGE NOTE PROVIDER - NSDCFUADDAPPT_GEN_ALL_CORE_FT
- follow up with surgeon within 1-2 weeks  - follow up with cardiologist, Dr. Da Silva, within a week for further cardiac work up

## 2022-04-05 NOTE — DISCHARGE NOTE PROVIDER - PROVIDER TOKENS
PROVIDER:[TOKEN:[[id number]],FOLLOWUP:[2 weeks]],PROVIDER:[TOKEN:[[id number]],FOLLOWUP:[1 week]]

## 2022-04-05 NOTE — DISCHARGE NOTE PROVIDER - CARE PROVIDER_API CALL
Chavo Schulz)  Surgery  [address] [place] [address]  Phone: [phone]  Fax: [phone]  Follow Up Time: 2 weeks    Raghav Da Silva; PhD)  Cardiology; Internal Medicine; Vascular Medicine  [address] [place] [address], [address]  [place] [address]  Phone: [phone]  Fax: [phone]  Follow Up Time: 1 week

## 2022-04-05 NOTE — CHART NOTE - NSCHARTNOTEFT_GEN_A_CORE
Focus note:   Admit with morbid obesity (BMI 45.3). S/p sleeve gastrectomy 4/3 . Discussed with RN, pt tolerating clear liquid diet (bariatric). Pt has   discharge instruction sheet and Bariatric Surgery Nutrition Guidelines. Reviewed diet protocols, emphasis on  adequate protein/fluid intake. Discussed support groups, exercise, sleep and follow-up with ANDRIY GRIMES to monitor. ANA MARIA holly Focus note:   Admit with morbid obesity (BMI 45.3). S/p sleeve gastrectomy 4/4 . Discussed with  Pt taking clear liquid diet (bariatric). Pt has Dr. weems instruction sheet and Bariatric Surgery Nutrition Guidelines. Reviewed diet protocols, emphasis on  adequate protein/fluid intake. Discussed support groups, exercise, sleep and follow-up with ANDRIY GRIMES to monitor. ANA MARIA holly Focus note:   Admit with morbid obesity (BMI 45.3). S/p sleeve gastrectomy 4/4 .  Pt taking clear liquid diet (bariatric). Pt has  discharge instruction sheet and Bariatric Surgery Nutrition Guidelines. Reviewed diet protocols, emphasis on  adequate protein/fluid intake. Discussed support groups, exercise, sleep and follow-up with ANDRIY GRIMES to monitor. ANA MARIA holly

## 2022-04-05 NOTE — DISCHARGE NOTE NURSING/CASE MANAGEMENT/SOCIAL WORK - NSDCPEFALRISK_GEN_ALL_CORE
For information on Fall & Injury Prevention, visit: [email or web address] OR  [email or web address] OR  https://www.cdc.gov/steadi/patient.html

## 2022-04-07 LAB — SURGICAL PATHOLOGY STUDY: SIGNIFICANT CHANGE UP

## 2022-04-13 ENCOUNTER — APPOINTMENT (OUTPATIENT)
Dept: SURGERY | Facility: CLINIC | Age: 47
End: 2022-04-13
Payer: COMMERCIAL

## 2022-04-13 ENCOUNTER — APPOINTMENT (OUTPATIENT)
Dept: BARIATRICS | Facility: CLINIC | Age: 47
End: 2022-04-13
Payer: COMMERCIAL

## 2022-04-13 VITALS
BODY MASS INDEX: 44.05 KG/M2 | HEIGHT: 64 IN | DIASTOLIC BLOOD PRESSURE: 85 MMHG | WEIGHT: 258 LBS | HEART RATE: 90 BPM | SYSTOLIC BLOOD PRESSURE: 135 MMHG

## 2022-04-13 VITALS — TEMPERATURE: 98 F

## 2022-04-13 PROCEDURE — 99024 POSTOP FOLLOW-UP VISIT: CPT

## 2022-04-13 PROCEDURE — ZZZZZ: CPT

## 2022-04-13 NOTE — PHYSICAL EXAM
[Obese, well nourished, in no acute distress] : obese, well nourished, in no acute distress [Normal] : affect appropriate [de-identified] : Soft, nontender, nondistended. No masses, rebound or guarding. Incisions well-approximated, without erythema or drainage.  No hernias palpable.

## 2022-04-13 NOTE — PLAN
[FreeTextEntry1] : I reviewed importance of behavioral modification and follow-up in order to optimize outcomes and avoid complications. Post-operative nutrition again reviewed and reinforced, including the importance of taking supplements, making healthy food choices.  \par The importance of maintaining regular exercise/physical activity to maximize progress also reinforced. \par \par Recommend patient to see nutritionist , which she will do today.  \par \par Patient's questions and concerns addressed to patient's satisfaction. \par \par She will return to see me in 1 month.

## 2022-04-13 NOTE — REVIEW OF SYSTEMS
[Patient Intake Form Reviewed] : Patient intake form was reviewed [Recent Change In Weight] : ~T recent weight change [Joint Pain] : joint pain [Joint Stiffness] : joint stiffness [Muscle Pain] : muscle pain [Negative] : Allergic/Immunologic [Fever] : no fever [Chills] : no chills [Fatigue] : no fatigue [Vision Problems] : no vision problems [Dysphagia] : no dysphagia [Odynophagia] : no odynophagia [Chest Pain] : no chest pain [Palpitations] : no palpitations [Shortness Of Breath] : no shortness of breath [Wheezing] : no wheezing [Abdominal Pain] : no abdominal pain [Vomiting] : no vomiting [Constipation] : no constipation [Reflux/Heartburn] : no reflux/ heartburn [Hernia] : no hernia [Dysuria] : no dysuria [Incontinence] : no incontinence [FreeTextEntry9] : back pain

## 2022-04-13 NOTE — HISTORY OF PRESENT ILLNESS
[de-identified] : Ms. VELAZQUEZ  is s/p Roboticassisted laparoscopic sleeve gastrectomy on 04/04/2022. \par Patient feels well overall and has no complaints at this time.  \par Patient is doing well with appropriate interval progress and weight loss since her surgery.  Patient is tolerating liquids, taking small portions and has rapid and prolonged satiety. Patient denies any complaints of fever, pain, diarrhea, heartburn, reflux, or vomiting.  She has had difficulty with constipation since her surgery but has not taken any laxative or stool softener.\par Compliant with supplements. \par \par \par Patient's medications, allergies, past medical, surgical, social and family histories were reviewed and updated as appropriate.  \par \par  [de-identified] : Patient reports no interval changes to medications, medical history or overall health status.\par

## 2022-04-13 NOTE — ASSESSMENT
[FreeTextEntry1] : Patient with history of severe obesity s/p sleeve gastrectomy.   \par \par Patient  is doing well, with excellent post-operative recovery.  All surgical incisions are healing well and as expected.  There is no evidence of infection or complication, and patient is progressing as expected.  \par \par \par \par \par

## 2022-04-13 NOTE — DATA REVIEWED
[FreeTextEntry1] : \par  Pathology             Final\par \par No Documents Attached\par \par \par \par \par   [facility] Accession Number : [id number]\par Patient:   SHANON VELAZQUEZ\par \par \par Accession:                             [id number]\par \par Collected Date/Time:                   4/5/2022 12:37 EDT\par Received Date/Time:                    4/5/2022 12:37 EDT\par \par Surgical Pathology Report - Auth (Verified)\par \par Specimen(s) Submitted\par 1  PORTION OF STOMACH\par \par Final Diagnosis\par Portion of stomach, sleeve gastrectomy: Stomach wall with oxyntic mucosa\par and patchy, focally dense chronic inflammation.\par Negative for H. pylori by cresyl violet stain\par \par Verified by: Autumn Loya M.D.\par (Electronic Signature)\par Reported on: 04/07/22 14:04 EDT, [facility], [address]\par [address] [place] [id number]\par _________________________________________________________________\par \par Clinical Information\par [place] obecity\par \par \par Gross Description\par Received:  In formalin labeled  "portion of the stomach"\par Dimensions:  22.0 x 5.0 cm with a 20 cm stapled surgical resection margin\par Gastric Wall Thickness:   1.0 cm\par Inking: Stapled Margin:   Black\par Serosa:  Pink-tan and glistening with a jagged staple line\par Gastric Mucosa:  Tan-pink and unremarkable\par Submitted:  Representative sections in one cassette: 1A.\par \par Samina Hooks 04/06/2022 09:19 AM\par \par Disclaimer\par In addition to other data that may appear on the specimen containers, all\par labels have been inspected to confirm the presence of the patient's name\par and date of birth.\par \par Histological processing performed at [facility]\par [facility], [place] [address].\par NULL\par YES\par [phone]\par \par  \par \par  Ordered by: REFUGIO LAL       Collected/Examined: 05Apr2022 12:37PM       \par Verified by: REFUIGO LAL 12Apr2022 11:27AM       \par  Result Communication: No patient communication needed at this time;\par Stage: Final       \par  Performed at: [facility]       Resulted: 07Apr2022 02:04PM       Last Updated: 12Apr2022 11:27AM       Accession: [id number]

## 2022-04-25 ENCOUNTER — APPOINTMENT (OUTPATIENT)
Dept: CARDIOLOGY | Facility: CLINIC | Age: 47
End: 2022-04-25
Payer: COMMERCIAL

## 2022-04-25 ENCOUNTER — NON-APPOINTMENT (OUTPATIENT)
Age: 47
End: 2022-04-25

## 2022-04-25 VITALS
OXYGEN SATURATION: 96 % | DIASTOLIC BLOOD PRESSURE: 83 MMHG | RESPIRATION RATE: 16 BRPM | TEMPERATURE: 97.7 F | BODY MASS INDEX: 43.36 KG/M2 | HEART RATE: 81 BPM | WEIGHT: 254 LBS | SYSTOLIC BLOOD PRESSURE: 118 MMHG | HEIGHT: 64 IN

## 2022-04-25 DIAGNOSIS — R94.39 ABNORMAL RESULT OF OTHER CARDIOVASCULAR FUNCTION STUDY: ICD-10-CM

## 2022-04-25 DIAGNOSIS — R07.89 OTHER CHEST PAIN: ICD-10-CM

## 2022-04-25 DIAGNOSIS — R06.00 DYSPNEA, UNSPECIFIED: ICD-10-CM

## 2022-04-25 DIAGNOSIS — Z13.6 ENCOUNTER FOR SCREENING FOR CARDIOVASCULAR DISORDERS: ICD-10-CM

## 2022-04-25 PROCEDURE — 93000 ELECTROCARDIOGRAM COMPLETE: CPT

## 2022-04-25 PROCEDURE — 99214 OFFICE O/P EST MOD 30 MIN: CPT

## 2022-04-26 PROBLEM — R94.39 POSITIVE CARDIAC STRESS TEST: Status: ACTIVE | Noted: 2021-07-26

## 2022-04-26 PROBLEM — R06.00 EXERTIONAL DYSPNEA: Status: ACTIVE | Noted: 2021-07-26

## 2022-04-26 PROBLEM — R07.89 ATYPICAL CHEST PAIN: Status: ACTIVE | Noted: 2022-04-26

## 2022-04-26 PROBLEM — Z13.6 SCREENING FOR CARDIOVASCULAR CONDITION: Status: ACTIVE | Noted: 2021-07-26

## 2022-05-04 ENCOUNTER — APPOINTMENT (OUTPATIENT)
Dept: BARIATRICS | Facility: CLINIC | Age: 47
End: 2022-05-04

## 2022-05-04 ENCOUNTER — APPOINTMENT (OUTPATIENT)
Dept: SURGERY | Facility: CLINIC | Age: 47
End: 2022-05-04
Payer: COMMERCIAL

## 2022-05-04 VITALS
HEIGHT: 64 IN | WEIGHT: 250 LBS | BODY MASS INDEX: 42.68 KG/M2 | HEART RATE: 85 BPM | SYSTOLIC BLOOD PRESSURE: 117 MMHG | DIASTOLIC BLOOD PRESSURE: 82 MMHG

## 2022-05-04 VITALS — TEMPERATURE: 96.8 F

## 2022-05-04 PROCEDURE — 99024 POSTOP FOLLOW-UP VISIT: CPT

## 2022-05-04 NOTE — ASSESSMENT
[FreeTextEntry1] : Patient with history of severe obesity s/p sleeve gastrectomy . \par \par Patient is doing well since the prior visit, with excellent interval and overall  progress.  Patient is tolerating an appropriate portion controlled and nutritionally balanced diet without difficulty.  She may be struggling with making appropriate nutritional choices. \par \par

## 2022-05-04 NOTE — REVIEW OF SYSTEMS
[Patient Intake Form Reviewed] : Patient intake form was reviewed [Joint Pain] : joint pain [Joint Stiffness] : joint stiffness [Muscle Pain] : muscle pain [Negative] : Allergic/Immunologic [Fever] : no fever [Chills] : no chills [Fatigue] : no fatigue [Vision Problems] : no vision problems [Dysphagia] : no dysphagia [Odynophagia] : no odynophagia [Chest Pain] : no chest pain [Palpitations] : no palpitations [Shortness Of Breath] : no shortness of breath [Wheezing] : no wheezing [Abdominal Pain] : no abdominal pain [Vomiting] : no vomiting [Constipation] : no constipation [Reflux/Heartburn] : no reflux/ heartburn [Hernia] : no hernia [Dysuria] : no dysuria [Incontinence] : no incontinence [FreeTextEntry9] : back pain

## 2022-05-04 NOTE — PHYSICAL EXAM
[Obese, well nourished, in no acute distress] : obese, well nourished, in no acute distress [Normal] : affect appropriate [de-identified] : Soft, nontender, nondistended. No masses, rebound or guarding. Incisions well-healed.  No hernias palpable.

## 2022-05-04 NOTE — HISTORY OF PRESENT ILLNESS
[Procedure: ___] : Procedure performed: [unfilled]  [Date of Surgery: ___] : Date of Surgery:   [unfilled] [Surgeon Name:   ___] : Surgeon Name: Dr. ROMO [Pre-Op Weight ___] : Pre-op weight was [unfilled] lbs [de-identified] : Ms. VELAZQUEZ  is s/p Roboticassisted laparoscopic sleeve gastrectomy on 04/04/2022. \par  \par Patient feels well overall and has no complaints at this time.  \par Patient is doing well with good interval progress and weight loss.  Patient is mostly making good food choices, eating small portions and has rapid and prolonged satiety. She reports her usual breakfast is oatmeal, and she sometimes has difficulty with planning her meals for the day.  Patient denies any complaints of fever, pain, diarrhea, heartburn, reflux, or vomiting. Normal bowel function. Normal urination.\par Compliant with supplements. \par \par Patient's medications, allergies, past medical, surgical, social and family histories were reviewed and updated as appropriate.  \par \par  [de-identified] : Patient reports no interval changes to medications, medical history or overall health status.\par

## 2022-05-04 NOTE — PLAN
[FreeTextEntry1] : I reviewed importance of behavioral modification and follow-up in order to optimize outcomes and avoid complications. Post-operative nutrition again reviewed and reinforced, including the importance of taking supplements, making healthy food choices.  \par The importance of maintaining regular exercise/physical activity to maximize progress also reinforced. \par \par Recommend patient to see nutritionist today, and attend support groups.\par \par Patient's questions and concerns addressed to patient's satisfaction. \par \par She will return to see me in one month. \par \par

## 2022-06-07 NOTE — HISTORY OF PRESENT ILLNESS
[de-identified] : Ms. VELAZQUEZ  is s/p Roboticassisted laparoscopic sleeve gastrectomy on 04/04/2022. \par  [Procedure: ___] : Procedure performed: [unfilled]  [Date of Surgery: ___] : Date of Surgery:   [unfilled] [Surgeon Name:   ___] : Surgeon Name: Dr. ROMO [Pre-Op Weight ___] : Pre-op weight was [unfilled] lbs

## 2022-06-08 ENCOUNTER — APPOINTMENT (OUTPATIENT)
Dept: SURGERY | Facility: CLINIC | Age: 47
End: 2022-06-08

## 2022-06-22 ENCOUNTER — APPOINTMENT (OUTPATIENT)
Dept: SURGERY | Facility: CLINIC | Age: 47
End: 2022-06-22
Payer: COMMERCIAL

## 2022-06-22 VITALS
WEIGHT: 233 LBS | DIASTOLIC BLOOD PRESSURE: 88 MMHG | SYSTOLIC BLOOD PRESSURE: 128 MMHG | BODY MASS INDEX: 39.78 KG/M2 | HEART RATE: 88 BPM | HEIGHT: 64 IN

## 2022-06-22 VITALS — TEMPERATURE: 96.6 F

## 2022-06-22 PROCEDURE — 99024 POSTOP FOLLOW-UP VISIT: CPT

## 2022-06-22 NOTE — HISTORY OF PRESENT ILLNESS
[Procedure: ___] : Procedure performed: [unfilled]  [Date of Surgery: ___] : Date of Surgery:   [unfilled] [Surgeon Name:   ___] : Surgeon Name: Dr. ROMO [Pre-Op Weight ___] : Pre-op weight was [unfilled] lbs [de-identified] : Ms. VELAZQUEZ  is s/p Roboticassisted laparoscopic sleeve gastrectomy on 04/04/2022. \par \par Patient feels well overall and has no complaints at this time, aside from occasional fatigue.  \par Patient is doing well with good interval progress and weight loss.  Patient is making good food choices, eating small portions and has rapid and prolonged satiety. Patient denies any complaints of fever, pain, diarrhea, heartburn, reflux, or vomiting, except when she eats too fast or too large a portion. She finds that chicken is particularly difficult.  She is struggling with protein shakes and has started using SlimFast with 20 grams of protein.  Normal bowel function. Normal urination.\par Compliant with supplements. \par Patient is engaging in regular exercise. \par \par Patient's medications, allergies, past medical, surgical, social and family histories were reviewed and updated as appropriate.  \par \par \par  [de-identified] : Patient reports no interval changes to medications, medical history or overall health status.\par

## 2022-06-22 NOTE — PLAN
[FreeTextEntry1] : I reviewed importance of behavioral modification and follow-up in order to optimize outcomes and avoid complications. Post-operative nutrition again reviewed and reinforced, including the importance of taking supplements, making healthy food choices.  I also stressed the importance of being mindful of the portion size as well as the spacing of her portions to avoid excessive intake, leading to dysphagia or emesis.\par The importance of maintaining regular exercise/physical activity to maximize progress also reinforced. \par \par Recommend patient to see nutritionist and attend support groups.\par \par Patient's questions and concerns addressed to patient's satisfaction. \par \par She will return to see me in one month.  We will also do a full set of blood work at that time.

## 2022-06-22 NOTE — PHYSICAL EXAM
[Obese, well nourished, in no acute distress] : obese, well nourished, in no acute distress [Normal] : affect appropriate [de-identified] : Soft, nontender, nondistended. No masses, rebound or guarding. Incisions well-healed.  No hernias palpable.

## 2022-08-03 ENCOUNTER — APPOINTMENT (OUTPATIENT)
Dept: SURGERY | Facility: CLINIC | Age: 47
End: 2022-08-03

## 2022-10-03 ENCOUNTER — APPOINTMENT (OUTPATIENT)
Dept: CARDIOLOGY | Facility: CLINIC | Age: 47
End: 2022-10-03

## 2023-02-01 ENCOUNTER — APPOINTMENT (OUTPATIENT)
Dept: SURGERY | Facility: CLINIC | Age: 48
End: 2023-02-01
Payer: COMMERCIAL

## 2023-02-01 VITALS
DIASTOLIC BLOOD PRESSURE: 80 MMHG | HEART RATE: 98 BPM | SYSTOLIC BLOOD PRESSURE: 124 MMHG | HEIGHT: 64.5 IN | BODY MASS INDEX: 35.23 KG/M2 | WEIGHT: 208.9 LBS

## 2023-02-01 PROCEDURE — 99214 OFFICE O/P EST MOD 30 MIN: CPT

## 2023-02-01 NOTE — PHYSICAL EXAM
[Obese, well nourished, in no acute distress] : obese, well nourished, in no acute distress [Normal] : affect appropriate [de-identified] : Soft, nontender, nondistended. No masses, rebound or guarding. Incisions well-healed.  No hernias palpable.

## 2023-02-01 NOTE — PLAN
[FreeTextEntry1] : I reviewed importance of behavioral modification and follow-up in order to optimize outcomes and avoid complications. Post-operative nutrition again reviewed and reinforced, including the importance of taking supplements, making healthy food choices.  \par The importance of maintaining regular exercise/physical activity to maximize progress also reinforced. \par \par Recommend patient to see nutritionist and attend support groups.\par \par I support her proceeding with reconstructive surgery as planned.  \par \par Patient's questions and concerns addressed to patient's satisfaction. \par \par

## 2023-02-01 NOTE — HISTORY OF PRESENT ILLNESS
[Procedure: ___] : Procedure performed: [unfilled]  [Date of Surgery: ___] : Date of Surgery:   [unfilled] [Surgeon Name:   ___] : Surgeon Name: Dr. ROMO [Pre-Op Weight ___] : Pre-op weight was [unfilled] lbs [de-identified] : Ms. VELAZQUEZ  is s/p Roboticassisted laparoscopic sleeve gastrectomy on 04/04/2022. \par  \par Patient feels well overall and has no complaints at this time.  \par Patient is doing well with good interval progress and weight loss.  Patient is making good food choices, eating small portions and has rapid and prolonged satiety. Patient denies any complaints of fever, pain, diarrhea, heartburn, reflux, or vomiting. Normal bowel function. Normal urination.\par Compliant with supplements. \par Patient is engaging in regular exercise. \par SHe has consulted with a reconstructive surgeon, and is preparing for mastopexy and abdominoplasty to address issues with sagging and irritated skin that has resulted from her weight loss.  \par \par Patient's medications, allergies, past medical, surgical, social and family histories were reviewed and updated as appropriate.  \par \par \par  [de-identified] : Patient reports no interval changes to medications, medical history or overall health status.\par

## 2023-02-01 NOTE — REVIEW OF SYSTEMS
[Patient Intake Form Reviewed] : Patient intake form was reviewed [Fever] : no fever [Chills] : no chills [Fatigue] : no fatigue [Vision Problems] : no vision problems [Dysphagia] : no dysphagia [Odynophagia] : no odynophagia [Chest Pain] : no chest pain [Palpitations] : no palpitations [Shortness Of Breath] : no shortness of breath [Wheezing] : no wheezing [Abdominal Pain] : no abdominal pain [Vomiting] : no vomiting [Constipation] : no constipation [Reflux/Heartburn] : no reflux/ heartburn [Hernia] : no hernia [Dysuria] : no dysuria [Incontinence] : no incontinence [Joint Pain] : no joint pain [Joint Stiffness] : no joint stiffness [Muscle Pain] : no muscle pain [Skin Rash] : skin rash [Negative] : Allergic/Immunologic [FreeTextEntry9] : back pain  [de-identified] : Rash under breasts and lower abdomen

## 2023-08-22 ENCOUNTER — APPOINTMENT (OUTPATIENT)
Age: 48
End: 2023-08-22
Payer: COMMERCIAL

## 2023-08-22 VITALS
SYSTOLIC BLOOD PRESSURE: 141 MMHG | DIASTOLIC BLOOD PRESSURE: 92 MMHG | WEIGHT: 225 LBS | OXYGEN SATURATION: 100 % | BODY MASS INDEX: 37.95 KG/M2 | HEART RATE: 66 BPM | HEIGHT: 64.5 IN

## 2023-08-22 PROCEDURE — 99213 OFFICE O/P EST LOW 20 MIN: CPT

## 2023-08-23 NOTE — HISTORY OF PRESENT ILLNESS
[Procedure: ___] : Procedure performed: [unfilled]  [Date of Surgery: ___] : Date of Surgery:   [unfilled] [Surgeon Name:   ___] : Surgeon Name: Dr. ROMO [Pre-Op Weight ___] : Pre-op weight was [unfilled] lbs [de-identified] : SHANON VLEAZQUEZ is a 48 year old female who present in the office for a follow up s/pSYOLIS VELAZQUEZ is a 47 year old female who present in the office for a follow up s/p Robotic assisted Laparoscopic sleeve gastrectomy on 04/04/2023. Patient able to tolerate  stage 4 diet, compliant with medications and vitamins  (MVI, Calcium and vitamin D, and vitamin B 12 ), drinks enough fluids  ( 64 oz daily).  Patient denies any chest pain, shortness of breath, dizziness or weakness. Ms. VELAZQUEZ denies Acid reflux. Patient stated that started working nights and she start gaining weight. Patient denies any pain or discomfort at present time. Patient's questions and concerns addressed to patient's satisfaction. Patient has no recent ER visit post surgery. Patient wishes to start Medical Weight Loss Management start on weight loss medication.

## 2023-08-23 NOTE — PHYSICAL EXAM
[Obese] : obese [Normal] : affect appropriate [de-identified] : Normoactive bowel sounds, soft and nontender, no hepatosplenomegaly or masses noted.

## 2023-08-23 NOTE — PLAN
[FreeTextEntry1] : Please follow up at the office in 6 month and as needed for any questions or concerns

## 2023-08-23 NOTE — ASSESSMENT
[FreeTextEntry1] : SHANON VELAZQUEZ is a 47 year old female who underwent a Robotic assisted Laparoscopic Sleeve Gastrectomy on 04/04/2022  On oral medication and vitamins. Feels well, Patient regained weight, wishes to be started on Medical Weight Loss Management medications.  Pre-op weight: 258 lbs Ideal body weight: 145 lbs Today's weight:_225 lbs. Today's BMI : 38.02 kg/m2 Excess body weight: 113 lbs total weight loss since surgery: 33 lbs % of Excess Body Weight Loss: 29 %.  - Stay well hydrated. - Reinforced need for daily MVI, vit D, and vit B 12. - Reinforced need for adequate hydration (at least 64 oz daily) and adequate protein intake (at least 60 g daily) - Reinforced need to chew food properly before swallowing.  - Instructed patient not to eat and drink at the same time and to space them out 30 minutes apart.  - Instructed patient not to drink from a straw, chew gum, or drink carbonated beverages. - Informed patient about post-op support groups held every 3rd Tuesday of the month. - Please continue with stage 4 solid food at this time.  - Informed no restrictions with exercise at this time.  - Follow-up in 6 months - Call with concerns.  - Follow-up blood work this visit

## 2023-08-24 DIAGNOSIS — E88.81 METABOLIC SYNDROME: ICD-10-CM

## 2023-08-24 LAB
25(OH)D3 SERPL-MCNC: 36.3 NG/ML
ALBUMIN SERPL ELPH-MCNC: 5.3 G/DL
ALP BLD-CCNC: 134 U/L
ALT SERPL-CCNC: 15 U/L
ANION GAP SERPL CALC-SCNC: 12 MMOL/L
APTT BLD: 40.9 SEC
AST SERPL-CCNC: 16 U/L
BILIRUB SERPL-MCNC: 0.3 MG/DL
BUN SERPL-MCNC: 14 MG/DL
CALCIUM SERPL-MCNC: 10.4 MG/DL
CALCIUM SERPL-MCNC: 10.4 MG/DL
CHLORIDE SERPL-SCNC: 98 MMOL/L
CHOLEST SERPL-MCNC: 266 MG/DL
CO2 SERPL-SCNC: 28 MMOL/L
CREAT SERPL-MCNC: 0.69 MG/DL
EGFR: 106 ML/MIN/1.73M2
ESTIMATED AVERAGE GLUCOSE: 126 MG/DL
FERRITIN SERPL-MCNC: 49 NG/ML
FOLATE SERPL-MCNC: 12.7 NG/ML
GLUCOSE SERPL-MCNC: 64 MG/DL
HBA1C MFR BLD HPLC: 6 %
HDLC SERPL-MCNC: 100 MG/DL
INR PPP: 0.92 RATIO
IRON SATN MFR SERPL: 14 %
IRON SERPL-MCNC: 60 UG/DL
LDLC SERPL CALC-MCNC: 159 MG/DL
NONHDLC SERPL-MCNC: 166 MG/DL
PARATHYROID HORMONE INTACT: 75 PG/ML
POTASSIUM SERPL-SCNC: 4.8 MMOL/L
PROT SERPL-MCNC: 8.7 G/DL
PT BLD: 10.5 SEC
SODIUM SERPL-SCNC: 138 MMOL/L
TIBC SERPL-MCNC: 421 UG/DL
TRIGL SERPL-MCNC: 47 MG/DL
TSH SERPL-ACNC: 1.75 UIU/ML
UIBC SERPL-MCNC: 361 UG/DL
VIT B12 SERPL-MCNC: 699 PG/ML

## 2023-08-28 LAB — VIT B1 SERPL-MCNC: 109.5 NMOL/L

## 2023-09-12 ENCOUNTER — APPOINTMENT (OUTPATIENT)
Age: 48
End: 2023-09-12
Payer: COMMERCIAL

## 2023-09-12 VITALS
WEIGHT: 223 LBS | DIASTOLIC BLOOD PRESSURE: 85 MMHG | SYSTOLIC BLOOD PRESSURE: 131 MMHG | HEIGHT: 64 IN | OXYGEN SATURATION: 100 % | BODY MASS INDEX: 38.07 KG/M2 | HEART RATE: 73 BPM

## 2023-09-12 DIAGNOSIS — Z98.84 BARIATRIC SURGERY STATUS: ICD-10-CM

## 2023-09-12 DIAGNOSIS — E66.01 MORBID (SEVERE) OBESITY DUE TO EXCESS CALORIES: ICD-10-CM

## 2023-09-12 DIAGNOSIS — R73.03 PREDIABETES.: ICD-10-CM

## 2023-09-12 PROCEDURE — 99213 OFFICE O/P EST LOW 20 MIN: CPT

## 2023-09-21 LAB
ALBUMIN SERPL ELPH-MCNC: 4.6 G/DL
ALP BLD-CCNC: 106 U/L
ALT SERPL-CCNC: 10 U/L
ANION GAP SERPL CALC-SCNC: 14 MMOL/L
AST SERPL-CCNC: 13 U/L
BILIRUB SERPL-MCNC: 0.4 MG/DL
BUN SERPL-MCNC: 14 MG/DL
CALCIUM SERPL-MCNC: 10.1 MG/DL
CHLORIDE SERPL-SCNC: 99 MMOL/L
CO2 SERPL-SCNC: 24 MMOL/L
CREAT SERPL-MCNC: 0.66 MG/DL
EGFR: 107 ML/MIN/1.73M2
GLUCOSE SERPL-MCNC: 73 MG/DL
POTASSIUM SERPL-SCNC: 4.9 MMOL/L
PROT SERPL-MCNC: 7.7 G/DL
SODIUM SERPL-SCNC: 137 MMOL/L

## 2023-09-21 RX ORDER — SEMAGLUTIDE 0.5 MG/.5ML
0.5 INJECTION, SOLUTION SUBCUTANEOUS WEEKLY
Qty: 1 | Refills: 0 | Status: ACTIVE | COMMUNITY
Start: 2023-08-24 | End: 1900-01-01

## 2023-11-04 NOTE — BRIEF OPERATIVE NOTE - SURGICAL END DATE/TIME
CONSERVATIVE TREATMENT FOR PEDIATRIC URI (VIRAL):   PLEASE DOUBLE CHECK WITH PEDIATRICIAN TO ENSURE THAT ALL BELOW SUGGESTING MEDICATIONS OR SAFE FOR YOUR CHILD.  REFER TO MEDICATION LABELING FOR CORRECT DOSAGE    Using a humidifier and propping your child up will help him/her with symptom relief.     You can give Children's Zyrtec or children's Claritin or Children's Benadryl once daily to help with cough and runny nose.    You can give Children's Mucinex (100mg every 4 hours) for congestion and Children's Delsym (15mg every 6 hours) for cough.     Your child can use Flonase or nasal saline spray to clear nasal drainage and help with nasal congestion.     You can place a thin layer of Vicks vapor rub of the the soles of the feet and place on socks to help with congestion.  You can also apply a little over the chest.  Please avoid placing Vicks on the face as it is too strong for your child's facial area.    Monitor your child's temperature and ALTERNATE Tylenol every 4 hours and/or Ibuprofen (Motrin) every 6-8 hours as needed for fever (100.4F or greater), headache and/or body aches.     Make sure your child is drinking plenty fluids and getting plenty of rest.    You should follow-up with your child's pediatrician.    Go to the ER if your child's fever is not controlled with Tylenol and/or Ibuprofen, or for any further worsening or concerning symptoms such as but not limited to:  Not making urine, not able to make with ears, or severe inconsolability.         
26-Jul-2018

## 2024-03-01 NOTE — ED ADULT NURSE NOTE - CAS DISCH CONDITION
Cardiology Consultation     Harvey Kaminski  1959    PCP: Jolie Lopez APRN - NP  Referring Physician: Dr. Trung Cuba   Reason for Referral: PAD   Chief Complaint: \"I am doing good\"   Chief Complaint   Patient presents with    Follow-Up from Hospital     No      Subjective:   History of Present Illness: The patient is 64 y.o. male with a past medical history significant for CAD s/p CABG, HLD, HTN, carotid disease, prior DVT, CVA, PAD s/p SFA stent, left BKA, s/p nephrectomy, prior nicotine addiction and alcohol abuse. He presented to [facility] 5/2020 with severe chest pain and found to have NSTEMI. He underwent heart catheterization revealing severe triple-vessel disease and subsequently underwent CABG x 4 on 5/8/20 with Dr. Robles. He did suffer from encephalopathy/delirium following bypass. He is s/p tracheostomy and PEG placement. He was discharged to [place] for skilled nursing care. He was seen in office with Dr. Robles for follow up where it was noted that he was admitted to  for bilateral foot wounds. He underwent peripheral angiograms with intervention to both legs while inpatient at . He underwent left BKA on 10/22/20. Lower extremity dopplers showed significant stenosis and underwent angiography 2/21/21 that showed severe right distal SFA/popliteal and tibial disease. S/p successful revascularization and SFA stenting. He reported worsening right foot pain and underwent peripheral that resulted in right popliteal aneurysm s/p successful stenting with 6.0 Viabahn. He underwent carotid stent 5/2023. S/p revascularization RLE 1/2024. Presents today for follow up. Reports overall he is feeling good. Wound vac still in place on left leg, reports he will be getting fitting for prosthetic soon. Denies any new wounds or pain to his right leg.         Past Medical History:   Diagnosis Date    Acute cerebral infarction (HCC) 05/2020    R posterior frontal lobe    Alcohol  Stable

## 2024-06-07 NOTE — H&P PST ADULT - NS PRO TALK SOMEONE YN
Render Risk Assessment In Note?: no Additional Notes: Advised patient to contact [facility] to schedule consultation. Detail Level: Simple no

## 2025-02-22 NOTE — ED PROVIDER NOTE - OBJECTIVE STATEMENT
43 y/o F pt with PMHx of GERD, Herniated Disc in the Lumbar Region, and Ovarian Cyst and PSHx of  and D&C presents to ED c/o L hip pain s/p mechanical fall today. Pt additionally reports burning sensation down the L leg. 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 Pt reports she was able to ambulate normally after the fall. Pt notes Hx of back problems and Hx of back pain. Pt denies head trauma, LOC, nausea, vomiting, numbness (down b/l legs), tingling (down b/l legs), or any other complaints. Pt also denies having taken any medications for pain relief PTA in ED, Hx of smoking, EtOH use/abuse, Hx of DM, or Hx of HTN. NKDA. show DISCHARGE

## 2025-05-28 ENCOUNTER — NON-APPOINTMENT (OUTPATIENT)
Age: 50
End: 2025-05-28

## 2025-06-26 ENCOUNTER — OUTPATIENT (OUTPATIENT)
Dept: OUTPATIENT SERVICES | Facility: HOSPITAL | Age: 50
LOS: 1 days | End: 2025-06-26
Payer: COMMERCIAL

## 2025-06-26 VITALS
OXYGEN SATURATION: 99 % | DIASTOLIC BLOOD PRESSURE: 79 MMHG | RESPIRATION RATE: 14 BRPM | TEMPERATURE: 98 F | SYSTOLIC BLOOD PRESSURE: 112 MMHG | HEART RATE: 88 BPM | WEIGHT: 233.03 LBS | HEIGHT: 64 IN

## 2025-06-26 DIAGNOSIS — Z98.890 OTHER SPECIFIED POSTPROCEDURAL STATES: Chronic | ICD-10-CM

## 2025-06-26 DIAGNOSIS — Z98.84 BARIATRIC SURGERY STATUS: Chronic | ICD-10-CM

## 2025-06-26 DIAGNOSIS — Z98.89 OTHER SPECIFIED POSTPROCEDURAL STATES: Chronic | ICD-10-CM

## 2025-06-26 DIAGNOSIS — N60.91 UNSPECIFIED BENIGN MAMMARY DYSPLASIA OF RIGHT BREAST: Chronic | ICD-10-CM

## 2025-06-26 DIAGNOSIS — Z98.891 HISTORY OF UTERINE SCAR FROM PREVIOUS SURGERY: Chronic | ICD-10-CM

## 2025-06-26 DIAGNOSIS — Z90.3 ACQUIRED ABSENCE OF STOMACH [PART OF]: Chronic | ICD-10-CM

## 2025-06-26 LAB
ANION GAP SERPL CALC-SCNC: 13 MMOL/L — SIGNIFICANT CHANGE UP (ref 5–17)
BUN SERPL-MCNC: 16 MG/DL — SIGNIFICANT CHANGE UP (ref 7–23)
CALCIUM SERPL-MCNC: 10.2 MG/DL — SIGNIFICANT CHANGE UP (ref 8.4–10.5)
CHLORIDE SERPL-SCNC: 105 MMOL/L — SIGNIFICANT CHANGE UP (ref 96–108)
CO2 SERPL-SCNC: 25 MMOL/L — SIGNIFICANT CHANGE UP (ref 22–31)
CREAT SERPL-MCNC: 0.84 MG/DL — SIGNIFICANT CHANGE UP (ref 0.5–1.3)
EGFR: 84 ML/MIN/1.73M2 — SIGNIFICANT CHANGE UP
EGFR: 84 ML/MIN/1.73M2 — SIGNIFICANT CHANGE UP
GLUCOSE SERPL-MCNC: 66 MG/DL — LOW (ref 70–99)
HCT VFR BLD CALC: 44.3 % — SIGNIFICANT CHANGE UP (ref 34.5–45)
HGB BLD-MCNC: 13.7 G/DL — SIGNIFICANT CHANGE UP (ref 11.5–15.5)
MCHC RBC-ENTMCNC: 27.7 PG — SIGNIFICANT CHANGE UP (ref 27–34)
MCHC RBC-ENTMCNC: 30.9 G/DL — LOW (ref 32–36)
MCV RBC AUTO: 89.7 FL — SIGNIFICANT CHANGE UP (ref 80–100)
NRBC # BLD AUTO: 0 K/UL — SIGNIFICANT CHANGE UP (ref 0–0)
NRBC # FLD: 0 K/UL — SIGNIFICANT CHANGE UP (ref 0–0)
NRBC BLD AUTO-RTO: 0 /100 WBCS — SIGNIFICANT CHANGE UP (ref 0–0)
PLATELET # BLD AUTO: 620 K/UL — HIGH (ref 150–400)
PMV BLD: 10 FL — SIGNIFICANT CHANGE UP (ref 7–13)
POTASSIUM SERPL-MCNC: 4.2 MMOL/L — SIGNIFICANT CHANGE UP (ref 3.5–5.3)
POTASSIUM SERPL-SCNC: 4.2 MMOL/L — SIGNIFICANT CHANGE UP (ref 3.5–5.3)
RBC # BLD: 4.94 M/UL — SIGNIFICANT CHANGE UP (ref 3.8–5.2)
RBC # FLD: 14.2 % — SIGNIFICANT CHANGE UP (ref 10.3–14.5)
SODIUM SERPL-SCNC: 143 MMOL/L — SIGNIFICANT CHANGE UP (ref 135–145)
WBC # BLD: 7.74 K/UL — SIGNIFICANT CHANGE UP (ref 3.8–10.5)
WBC # FLD AUTO: 7.74 K/UL — SIGNIFICANT CHANGE UP (ref 3.8–10.5)

## 2025-06-26 PROCEDURE — 80048 BASIC METABOLIC PNL TOTAL CA: CPT

## 2025-06-26 PROCEDURE — 36415 COLL VENOUS BLD VENIPUNCTURE: CPT

## 2025-06-26 PROCEDURE — G0463: CPT

## 2025-06-26 PROCEDURE — 85027 COMPLETE CBC AUTOMATED: CPT

## 2025-06-26 RX ORDER — LIDOCAINE HCL/PF 10 MG/ML
0.2 VIAL (ML) INJECTION ONCE
Refills: 0 | Status: DISCONTINUED | OUTPATIENT
Start: 2025-07-17 | End: 2025-07-31

## 2025-06-26 RX ORDER — SODIUM CHLORIDE 9 G/1000ML
1000 INJECTION, SOLUTION INTRAVENOUS
Refills: 0 | Status: DISCONTINUED | OUTPATIENT
Start: 2025-07-17 | End: 2025-07-31

## 2025-06-26 RX ORDER — CEFAZOLIN SODIUM IN 0.9 % NACL 3 G/100 ML
2000 INTRAVENOUS SOLUTION, PIGGYBACK (ML) INTRAVENOUS ONCE
Refills: 0 | Status: COMPLETED | OUTPATIENT
Start: 2025-07-17 | End: 2025-07-17

## 2025-06-26 NOTE — H&P PST ADULT - REASON FOR ADMISSION
Abdominal Lipectomy with Liposuction of bilateral Flanks  Abdominal Scar Revision  Bilateral Lateral Breast Scar Revision

## 2025-06-26 NOTE — H&P PST ADULT - PROBLEM SELECTOR PLAN 1
Hypertrophic Scar, Excessive Redundant Skin and Subcutaneous Tissue  Procedure: Abdominal Lipectomy with Liposuction of bilateral Flanks, Abdominal Scar Revision, Bilateral Lateral Breast Scar Revision on 7/17/2025  -cbc, bmp @ PST  -preop instructions discussed. instructed on how to perform chlorhexidine washes preop. understanding verbalized.  -instructed to hold zepbound I week preop with last dose on 7/3  - hold asprin 7 days preop per hematology, with last dose on 7/9  -BMI 40, confirmed ABRAM, OR booking notified

## 2025-06-26 NOTE — H&P PST ADULT - HISTORY OF PRESENT ILLNESS
49 year old female with PMH of ABRAM (no longer uses CPAP since 70lb weight loss), Morbid Obesity, S/P Laparoscopic sleeve gastrectomy on 04/04/2022, currently on Zepbound (denies GI symptoms), FIONA positive, Thrombocytosis on asprin 162mg daily, who now presents to PST prior to scheduled Abdominal Lipectomy with Liposuction of bilateral Flanks, Abdominal Scar Revision, Bilateral Lateral Breast Scar Revision on 7/17/2025.

## 2025-06-26 NOTE — H&P PST ADULT - NSICDXPASTSURGICALHX_GEN_ALL_CORE_FT
PAST SURGICAL HISTORY:  Benign cyst of right breast     H/O colonoscopy     H/O endoscopy     H/O shoulder surgery     History of 2  sections     History of adjustable gastric banding     History of arthroscopy of both knees     History of removal of laparoscopic gastric banding device     History of sleeve gastrectomy     S/P ACL repair     S/P D&C (status post dilation and curettage)     S/P lumbar discectomy     Status post breast reduction     Status post medial meniscus repair

## 2025-07-17 ENCOUNTER — OUTPATIENT (OUTPATIENT)
Dept: OUTPATIENT SERVICES | Facility: HOSPITAL | Age: 50
LOS: 1 days | End: 2025-07-17
Payer: COMMERCIAL

## 2025-07-17 ENCOUNTER — TRANSCRIPTION ENCOUNTER (OUTPATIENT)
Age: 50
End: 2025-07-17

## 2025-07-17 VITALS
SYSTOLIC BLOOD PRESSURE: 122 MMHG | WEIGHT: 233.03 LBS | TEMPERATURE: 98 F | HEIGHT: 64 IN | RESPIRATION RATE: 16 BRPM | HEART RATE: 76 BPM | DIASTOLIC BLOOD PRESSURE: 78 MMHG | OXYGEN SATURATION: 100 %

## 2025-07-17 DIAGNOSIS — Z98.891 HISTORY OF UTERINE SCAR FROM PREVIOUS SURGERY: Chronic | ICD-10-CM

## 2025-07-17 DIAGNOSIS — Z98.890 OTHER SPECIFIED POSTPROCEDURAL STATES: Chronic | ICD-10-CM

## 2025-07-17 DIAGNOSIS — Z98.84 BARIATRIC SURGERY STATUS: Chronic | ICD-10-CM

## 2025-07-17 DIAGNOSIS — Z98.89 OTHER SPECIFIED POSTPROCEDURAL STATES: Chronic | ICD-10-CM

## 2025-07-17 DIAGNOSIS — Z90.3 ACQUIRED ABSENCE OF STOMACH [PART OF]: Chronic | ICD-10-CM

## 2025-07-17 DIAGNOSIS — N60.91 UNSPECIFIED BENIGN MAMMARY DYSPLASIA OF RIGHT BREAST: Chronic | ICD-10-CM

## 2025-07-17 PROCEDURE — 88305 TISSUE EXAM BY PATHOLOGIST: CPT | Mod: 26

## 2025-07-17 RX ORDER — BISACODYL 5 MG
5 TABLET, DELAYED RELEASE (ENTERIC COATED) ORAL DAILY
Refills: 0 | Status: DISCONTINUED | OUTPATIENT
Start: 2025-07-17 | End: 2025-07-31

## 2025-07-17 RX ORDER — SODIUM CHLORIDE 9 G/1000ML
1000 INJECTION, SOLUTION INTRAVENOUS
Refills: 0 | Status: DISCONTINUED | OUTPATIENT
Start: 2025-07-17 | End: 2025-07-31

## 2025-07-17 RX ORDER — MELATONIN 5 MG
3 TABLET ORAL AT BEDTIME
Refills: 0 | Status: DISCONTINUED | OUTPATIENT
Start: 2025-07-17 | End: 2025-07-31

## 2025-07-17 RX ORDER — ASPIRIN 325 MG
2 TABLET ORAL
Refills: 0 | DISCHARGE

## 2025-07-17 RX ORDER — ENOXAPARIN SODIUM 100 MG/ML
40 INJECTION SUBCUTANEOUS ONCE
Refills: 0 | Status: DISCONTINUED | OUTPATIENT
Start: 2025-07-18 | End: 2025-07-31

## 2025-07-17 RX ORDER — APREPITANT 40 MG/1
40 CAPSULE ORAL ONCE
Refills: 0 | Status: COMPLETED | OUTPATIENT
Start: 2025-07-17 | End: 2025-07-17

## 2025-07-17 RX ORDER — ONDANSETRON HCL/PF 4 MG/2 ML
4 VIAL (ML) INJECTION EVERY 6 HOURS
Refills: 0 | Status: DISCONTINUED | OUTPATIENT
Start: 2025-07-17 | End: 2025-07-31

## 2025-07-17 RX ORDER — ACETAMINOPHEN 500 MG/5ML
650 LIQUID (ML) ORAL EVERY 6 HOURS
Refills: 0 | Status: DISCONTINUED | OUTPATIENT
Start: 2025-07-17 | End: 2025-07-31

## 2025-07-17 RX ORDER — HYDROMORPHONE/SOD CHLOR,ISO/PF 2 MG/10 ML
0.5 SYRINGE (ML) INJECTION EVERY 4 HOURS
Refills: 0 | Status: DISCONTINUED | OUTPATIENT
Start: 2025-07-17 | End: 2025-07-17

## 2025-07-17 RX ORDER — CEFAZOLIN SODIUM IN 0.9 % NACL 3 G/100 ML
1000 INTRAVENOUS SOLUTION, PIGGYBACK (ML) INTRAVENOUS EVERY 8 HOURS
Refills: 0 | Status: DISCONTINUED | OUTPATIENT
Start: 2025-07-17 | End: 2025-07-31

## 2025-07-17 RX ORDER — ACETAMINOPHEN 500 MG/5ML
1000 LIQUID (ML) ORAL ONCE
Refills: 0 | Status: COMPLETED | OUTPATIENT
Start: 2025-07-17 | End: 2025-07-17

## 2025-07-17 RX ORDER — HYDROMORPHONE/SOD CHLOR,ISO/PF 2 MG/10 ML
0.5 SYRINGE (ML) INJECTION
Refills: 0 | Status: DISCONTINUED | OUTPATIENT
Start: 2025-07-17 | End: 2025-07-17

## 2025-07-17 RX ORDER — OXYCODONE HYDROCHLORIDE 30 MG/1
5 TABLET ORAL EVERY 4 HOURS
Refills: 0 | Status: DISCONTINUED | OUTPATIENT
Start: 2025-07-17 | End: 2025-07-17

## 2025-07-17 RX ORDER — SENNA 187 MG
2 TABLET ORAL AT BEDTIME
Refills: 0 | Status: DISCONTINUED | OUTPATIENT
Start: 2025-07-17 | End: 2025-07-31

## 2025-07-17 RX ORDER — OXYCODONE HYDROCHLORIDE AND ACETAMINOPHEN 10; 325 MG/1; MG/1
2 TABLET ORAL ONCE
Refills: 0 | Status: DISCONTINUED | OUTPATIENT
Start: 2025-07-17 | End: 2025-07-17

## 2025-07-17 RX ORDER — LANOLIN/MINERAL OIL/PETROLATUM
1 OINTMENT (GRAM) OPHTHALMIC (EYE)
Refills: 0 | Status: DISCONTINUED | OUTPATIENT
Start: 2025-07-17 | End: 2025-07-31

## 2025-07-17 RX ORDER — CALCIUM CARBONATE 750 MG/1
1 TABLET ORAL EVERY 4 HOURS
Refills: 0 | Status: DISCONTINUED | OUTPATIENT
Start: 2025-07-17 | End: 2025-07-31

## 2025-07-17 RX ADMIN — OXYCODONE HYDROCHLORIDE 5 MILLIGRAM(S): 30 TABLET ORAL at 14:55

## 2025-07-17 RX ADMIN — APREPITANT 40 MILLIGRAM(S): 40 CAPSULE ORAL at 07:58

## 2025-07-17 RX ADMIN — Medication 1000 MILLIGRAM(S): at 22:34

## 2025-07-17 RX ADMIN — Medication 100 MILLIGRAM(S): at 18:56

## 2025-07-17 RX ADMIN — Medication 400 MILLIGRAM(S): at 22:30

## 2025-07-17 RX ADMIN — OXYCODONE HYDROCHLORIDE 5 MILLIGRAM(S): 30 TABLET ORAL at 14:23

## 2025-07-17 RX ADMIN — SODIUM CHLORIDE 100 MILLILITER(S): 9 INJECTION, SOLUTION INTRAVENOUS at 07:59

## 2025-07-17 RX ADMIN — Medication 0.5 MILLIGRAM(S): at 14:40

## 2025-07-17 RX ADMIN — Medication 3 MILLILITER(S): at 16:26

## 2025-07-17 RX ADMIN — Medication 3 MILLILITER(S): at 21:11

## 2025-07-17 RX ADMIN — Medication 100 MILLIGRAM(S): at 10:30

## 2025-07-17 RX ADMIN — CALCIUM CARBONATE 1 TABLET(S): 750 TABLET ORAL at 22:31

## 2025-07-17 RX ADMIN — Medication 3 MILLIGRAM(S): at 22:30

## 2025-07-17 RX ADMIN — Medication 0.5 MILLIGRAM(S): at 14:24

## 2025-07-17 NOTE — H&P ADULT - NSHPPHYSICALEXAM_GEN_ALL_CORE
T(C): 36.7 (07-17-25 @ 07:41), Max: 36.7 (07-17-25 @ 07:41)  HR: 76 (07-17-25 @ 07:41) (76 - 76)  BP: 122/78 (07-17-25 @ 07:41) (122/78 - 122/78)  RR: 16 (07-17-25 @ 07:41) (16 - 16)  SpO2: 100% (07-17-25 @ 07:41) (100% - 100%)    CONSTITUTIONAL: Well groomed, no apparent distress  EYES: PERRLA and symmetric, EOMI, No conjunctival or scleral injection, non-icteric  ENMT: Oral mucosa with moist membranes. Normal dentition; no pharyngeal injection or exudates             NECK: Supple, symmetric and without tracheal deviation   RESP: No respiratory distress, no use of accessory muscles; CTA b/l, no WRR  CV: RRR, +S1S2, no MRG; no JVD; no peripheral edema  GI: Soft, NT, ND, no rebound, no guarding; no palpable masses; no hepatosplenomegaly; no hernia palpated  LYMPH: No cervical LAD or tenderness; no axillary LAD or tenderness; no inguinal LAD or tenderness  MSK: Normal gait; No digital clubbing or cyanosis; examination of the (head/neck/spine/ribs/pelvis, RUE, LUE, RLE, LLE) without misalignment,            Normal ROM without pain, no spinal tenderness, normal muscle strength/tone  SKIN: No rashes or ulcers noted; no subcutaneous nodules or induration palpable  NEURO: CN II-XII intact; normal reflexes in upper and lower extremities, sensation intact in upper and lower extremities b/l to light touch   PSYCH: Appropriate insight/judgment; A+O x 3, mood and affect appropriate, recent/remote memory intact

## 2025-07-17 NOTE — ASU PATIENT PROFILE, ADULT - AS SC BRADEN ACTIVITY
(4) walks frequently Albendazole Pregnancy And Lactation Text: This medication is Pregnancy Category C and it isn't known if it is safe during pregnancy. It is also excreted in breast milk.

## 2025-07-17 NOTE — ASU DISCHARGE PLAN (ADULT/PEDIATRIC) - FINANCIAL ASSISTANCE
[facility] provides services at a reduced cost to those who are determined to be eligible through [facility]’s financial assistance program. Information regarding [facility]’s financial assistance program can be found by going to [email or web address] or by calling [phone].

## 2025-07-17 NOTE — ASU PATIENT PROFILE, ADULT - NS PRO MODE OF ARRIVAL
ambulatory Tranexamic Acid Counseling:  Patient advised of the small risk of bleeding problems with tranexamic acid. They were also instructed to call if they developed any nausea, vomiting or diarrhea. All of the patient's questions and concerns were addressed.

## 2025-07-17 NOTE — BRIEF OPERATIVE NOTE - NSICDXBRIEFPROCEDURE_GEN_ALL_CORE_FT
PROCEDURES:  Abdominoplasty with liposuction 17-Jul-2025 13:52:43  Shantelle Munroe  Creation of recipient site by excision of scar of trunk 17-Jul-2025 13:52:56  Shantelle Munroe

## 2025-07-17 NOTE — ASU DISCHARGE PLAN (ADULT/PEDIATRIC) - PROVIDER TOKENS
PROVIDER:[TOKEN:[[id number]],SCHEDULEDAPPT:[07/21/2025],SCHEDULEDAPPTTIME:[01:00 PM],ESTABLISHEDPATIENT:[T]]

## 2025-07-17 NOTE — ASU DISCHARGE PLAN (ADULT/PEDIATRIC) - NS MD DC FALL RISK RISK
For information on Fall & Injury Prevention, visit: [email or web address] OR  [email or web address] OR  https://www.cdc.gov/steadi/patient.html

## 2025-07-17 NOTE — ASU DISCHARGE PLAN (ADULT/PEDIATRIC) - CARE PROVIDER_API CALL
Shantelle Munroe)  Plastic Surgery  [address]  [place] [address]  Phone: [phone]  Fax: [phone]  Established Patient  Scheduled Appointment: 07/21/2025 01:00 PM

## 2025-07-17 NOTE — CHART NOTE - NSCHARTNOTEFT_GEN_A_CORE
Patient seen in PACU resting without complaints.  No Chest Pain, SOB, N/V.    T(C): 36.3 (07-17-25 @ 19:25), Max: 36.7 (07-17-25 @ 07:41)  HR: 70 (07-17-25 @ 19:25) (58 - 76)  BP: 110/63 (07-17-25 @ 19:25) (96/58 - 171/86)  RR: 15 (07-17-25 @ 19:25) (13 - 17)  SpO2: 97% (07-17-25 @ 19:25) (93% - 100%)  Wt(kg): --    Exam:  Alert and Oriented, No Acute Distress  Abdomen soft, NOHEMI x2 in place maintaining good suction  Abdominal binder/surgical incisions C/D/I      A/P: 49yFemale S/p Abdominoplasty with liposuction . VSS. NAD  -PT/OT-WBAT   -IS encouraged  -DVT PPx with lovenox  -Followup AM labs  -Pain Control  -23 hr stay    Norma Urias PA-C  Team Pager #[phone]

## 2025-07-17 NOTE — BRIEF OPERATIVE NOTE - NSICDXBRIEFPREOP_GEN_ALL_CORE_FT
PRE-OP DIAGNOSIS:  Lipodystrophy 17-Jul-2025 13:53:05  Shantelle Munroe  Hypertrophic scar 17-Jul-2025 13:53:33  Shantelle Munroe

## 2025-07-17 NOTE — ASU DISCHARGE PLAN (ADULT/PEDIATRIC) - ASU DC SPECIAL INSTRUCTIONSFT
-Take medications as prescribed   -Ambulate frequently, no heavy lifting or strenuous activity   -Can remove dressings and shower tomorrow   -Monitor for infection or collection   -Monitor output of drains twice a day Take medications as prescribed   ******************************************************************************************   Ambulate frequently, no heavy lifting or strenuous activity   ******************************************************************************************   Can remove dressings and shower tomorrow   ******************************************************************************************   Monitor for infection or collection   ******************************************************************************************   Monitor output of drains twice a day

## 2025-07-17 NOTE — H&P ADULT - HISTORY OF PRESENT ILLNESS
49 year old female presents today for bilateral vbreast scar revision and abdominoplasty with liposuction to bilateral flanks

## 2025-07-17 NOTE — ASU PATIENT PROFILE, ADULT - NSICDXPASTMEDICALHX_GEN_ALL_CORE_FT
PAST MEDICAL HISTORY:  FIONA positive     Elevated platelet count     GERD (gastroesophageal reflux disease)     Herniation of intervertebral disc of lumbar region     Hiatal hernia     Morbid obesity     Obstructive sleep apnea on CPAP     Osteoarthritis     Ovarian cyst     Tendonitis of left hip

## 2025-07-17 NOTE — ASU DISCHARGE PLAN (ADULT/PEDIATRIC) - ASU DISCHARGE DATE/TIME
Post-Care Instructions: I reviewed with the patient in detail post-care instructions. Patient should stay away from the sun and wear sun protection until treated areas are fully healed. 17-Jul-2025 14:32

## 2025-07-18 VITALS
HEART RATE: 78 BPM | SYSTOLIC BLOOD PRESSURE: 124 MMHG | DIASTOLIC BLOOD PRESSURE: 88 MMHG | RESPIRATION RATE: 17 BRPM | OXYGEN SATURATION: 97 %

## 2025-07-18 PROCEDURE — 14301 TIS TRNFR ANY 30.1-60 SQ CM: CPT | Mod: XS

## 2025-07-18 PROCEDURE — 22999 UNLISTED PX ABDOMEN MUSCSKEL: CPT

## 2025-07-18 PROCEDURE — 88305 TISSUE EXAM BY PATHOLOGIST: CPT

## 2025-07-18 PROCEDURE — 15847 EXC SKIN ABD ADD-ON: CPT

## 2025-07-18 PROCEDURE — 15877 SUCTION LIPECTOMY TRUNK: CPT

## 2025-07-18 PROCEDURE — 15830 EXC EXCESSIVE SKIN ABDOMEN: CPT

## 2025-07-18 PROCEDURE — 15834 EXC EXCESSIVE SKIN HIP: CPT | Mod: 50

## 2025-07-18 PROCEDURE — 14302 TIS TRNFR ADDL 30 SQ CM: CPT | Mod: XS

## 2025-07-18 RX ADMIN — Medication 3 MILLILITER(S): at 05:26

## 2025-07-18 RX ADMIN — Medication 100 MILLIGRAM(S): at 02:07

## 2025-07-22 LAB — SURGICAL PATHOLOGY STUDY: SIGNIFICANT CHANGE UP

## (undated) DEVICE — Device

## (undated) DEVICE — BLADE SCALPEL SAFETYLOCK #10

## (undated) DEVICE — TUBING STRYKER PNEUMOSURE HI FLOW INSUFFLATOR

## (undated) DEVICE — SOL IRR POUR NS 0.9% 500ML

## (undated) DEVICE — DRAPE TOWEL BLUE 17" X 24"

## (undated) DEVICE — NDL HYPO SAFE 18G X 1.5" (PINK)

## (undated) DEVICE — SPECIMEN CONTAINER 100ML

## (undated) DEVICE — DRSG STERISTRIPS 0.5 X 4"

## (undated) DEVICE — WARMING BLANKET LOWER ADULT

## (undated) DEVICE — GLV 6 PROTEXIS (CREAM) MICRO

## (undated) DEVICE — ELCTR BOVIE PENCIL SMOKE EVACUATION

## (undated) DEVICE — SUT HISTOACRYL BLUE

## (undated) DEVICE — WRAP COMPRESSION CALF MED

## (undated) DEVICE — SOL IRR BAG NS 0.9% 3000ML

## (undated) DEVICE — PACK GENERAL MINOR

## (undated) DEVICE — DRAIN JACKSON PRATT 7MM FLAT FULL NO TROCAR

## (undated) DEVICE — DRSG XEROFORM 1 X 8"

## (undated) DEVICE — FOR-ESU VALLEYLAB T6D04554DX: Type: DURABLE MEDICAL EQUIPMENT

## (undated) DEVICE — TAPE UMBILICAL 1/8 X 18" STRANDS

## (undated) DEVICE — ELCTR BOVIE PENCIL HANDPIECE

## (undated) DEVICE — SUT QUILL MONODERM 2-0 30CM 18MM

## (undated) DEVICE — STAPLER SKIN VISI-STAT 35 WIDE

## (undated) DEVICE — VISITEC 4X4

## (undated) DEVICE — SUT VICRYL 1 27" CPX UNDYED

## (undated) DEVICE — SUT POLYSORB 1-0 27" GS-21 UNDYED

## (undated) DEVICE — SUT PLAIN GUT FAST ABSORBING 5-0 PC-1

## (undated) DEVICE — SUT POLYSORB 0 30" GU-46

## (undated) DEVICE — SOL IRR POUR H2O 250ML

## (undated) DEVICE — DRSG DERMABOND PRINEO 60CM

## (undated) DEVICE — XI STAPLER SUREFORM 60

## (undated) DEVICE — DRSG COMBINE 5 X 9"

## (undated) DEVICE — WARMING BLANKET FULL UNDERBODY

## (undated) DEVICE — APPLICATOR VISTASEAL LAP DUAL 35CM RIGID

## (undated) DEVICE — SUT BIOSYN 4-0 18" P-12

## (undated) DEVICE — SUT VICRYL 2-0 27" CT-1 UNDYED

## (undated) DEVICE — SOL INJ NS 0.9% 100ML

## (undated) DEVICE — MEDICATION LABELS W MARKER

## (undated) DEVICE — TUBING STRYKEFLOW II SUCTION / IRRIGATOR

## (undated) DEVICE — PACK EXTREMITY

## (undated) DEVICE — SUT PDS II 1 36" CTX

## (undated) DEVICE — D HELP - CLEARVIEW CLEARIFY SYSTEM

## (undated) DEVICE — BLANKET WARMER UPPER ADULT

## (undated) DEVICE — STAPLER SKIN INSORB

## (undated) DEVICE — DRSG DERMABOND PRINEO 22CM

## (undated) DEVICE — XI GRASPER TIP UP FENESTRATED

## (undated) DEVICE — NDL HYPO SAFE 22G X 1.5"

## (undated) DEVICE — DRAPE INSTRUMENT POUCH 6.75" X 11"

## (undated) DEVICE — GLV 7.5 PROTEXIS

## (undated) DEVICE — SUT POLYSORB 2-0 30" GS-21 UNDYED

## (undated) DEVICE — TUBING INFILTRATION

## (undated) DEVICE — SOL INJ NS 0.9% 1000ML

## (undated) DEVICE — BLADE SCALPEL SAFETYLOCK #15

## (undated) DEVICE — NDL SPINAL 22G X 3.5"

## (undated) DEVICE — VISIGI 3D SLEEVE GASTRECTOMY 36FR

## (undated) DEVICE — ABDOMINAL BINDER XL 9" X 62"-74"

## (undated) DEVICE — XI SEALER DA VINCI VESSEL

## (undated) DEVICE — ELCTR BOVIE TIP BLADE INSULATED 2.75" EDGE

## (undated) DEVICE — SYR LUER LOK 20CC

## (undated) DEVICE — SUT PLAIN GUT 4-0 30" C-13

## (undated) DEVICE — SUT SOFSILK 2-0 30" V-20

## (undated) DEVICE — SUT MONOCRYL 4-0 27" PS-2 UNDYED

## (undated) DEVICE — POSITIONER MATTRESS HOVERMAT SPU LINK 39"

## (undated) DEVICE — SYR LUER LOK 50CC

## (undated) DEVICE — PACK BREAST MAJOR

## (undated) DEVICE — XI FORCEP CADIERE 8MM

## (undated) DEVICE — MARKING PEN W RULER

## (undated) DEVICE — POSITIONER MATTRESS PAD CONVOLUTED FOAM

## (undated) DEVICE — DRSG MAMMARY SUPPORT XL SIZE 5

## (undated) DEVICE — BLADE SURGICAL #15 CARBON

## (undated) DEVICE — ELCTR GROUNDING PAD ADULT COVIDIEN

## (undated) DEVICE — POSITIONER FOAM EGG CRATE ULNAR 2PCS (PINK)

## (undated) DEVICE — SUT SURGIPRO 1 30" GS-21

## (undated) DEVICE — DRSG CURITY GAUZE SPONGE 4 X 4" 12-PLY

## (undated) DEVICE — SUT PDO 2 1/2 CIRCLE 40MM NDL 45CM

## (undated) DEVICE — GOWN TRIMAX LG

## (undated) DEVICE — PREP CHLORAPREP HI-LITE ORANGE 26ML

## (undated) DEVICE — SUT QUILL MONODERM 3-0 30CM PS-2

## (undated) DEVICE — TUBING SUCTION NON CONDUCTIVE 316X18" STERILE

## (undated) DEVICE — GLV 7.5 ESTEEM BLUE

## (undated) DEVICE — SOL IRR POUR NS 0.9% 1500ML

## (undated) DEVICE — ABDOMINAL BINDER MED/LG 12" X 45"-62"

## (undated) DEVICE — TUBING LIPOSUCTION HI-VAC PSI-TEC

## (undated) DEVICE — PACK THYROID HEAD NECK

## (undated) DEVICE — DRAPE LIGHT HANDLE COVER (BLUE)

## (undated) DEVICE — TROCAR COVIDIEN MINI STEP 5MM SHORT

## (undated) DEVICE — SUT MONOCRYL 3-0 27" PS-2 UNDYED

## (undated) DEVICE — ABDOMINAL BINDER MED/LG 9" X 36"-64"

## (undated) DEVICE — TROCAR SURGIQUEST AIRSEAL 5MMX100MM

## (undated) DEVICE — DRSG XEROFORM 5 X 9"

## (undated) DEVICE — VENODYNE/SCD SLEEVE CALF LARGE

## (undated) DEVICE — FOLEY TRAY 16FR 5CC LTX UMETER CLOSED

## (undated) DEVICE — ELCTR BOVIE BLADE 3/4" EXTENDED LENGTH 6"

## (undated) DEVICE — SOL IRR POUR H2O 1500ML

## (undated) DEVICE — NDL HYPO SAFE 20G X 1.5" (YELLOW)

## (undated) DEVICE — TUBE TRI-LUMEN FILT USE W AIRSEAL

## (undated) DEVICE — LAP PAD 18 X 18"

## (undated) DEVICE — WARMING BLANKET UPPER ADULT

## (undated) DEVICE — DRAIN RESERVOIR FOR JACKSON PRATT 100CC CARDINAL

## (undated) DEVICE — TUBING ASPIRATION HI VAC LIPOSUCTION 8FT

## (undated) DEVICE — BASIN AMBULATORY

## (undated) DEVICE — SUT SOFSILK 2-0 18" C-23

## (undated) DEVICE — SUT TICRON 0 30" GS-22

## (undated) DEVICE — SUT POLYSORB 3-0 30" V-20 UNDYED

## (undated) DEVICE — NDL SURGI 150MM

## (undated) DEVICE — DRAIN CHANNEL 19FR ROUND FULL FLUTED

## (undated) DEVICE — DRAPE LIGHT HANDLE COVER BLUE

## (undated) DEVICE — DRAPE HALF SHEET 40X57"

## (undated) DEVICE — DRAPE MAYO STAND 30"